# Patient Record
Sex: FEMALE | Race: WHITE | NOT HISPANIC OR LATINO | Employment: STUDENT | URBAN - METROPOLITAN AREA
[De-identification: names, ages, dates, MRNs, and addresses within clinical notes are randomized per-mention and may not be internally consistent; named-entity substitution may affect disease eponyms.]

---
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
## 2022-01-23 ENCOUNTER — E-VISIT (OUTPATIENT)
Dept: PEDIATRICS | Facility: CLINIC | Age: 22
End: 2022-01-23
Payer: COMMERCIAL

## 2022-01-23 DIAGNOSIS — R53.83 FATIGUE, UNSPECIFIED TYPE: Primary | ICD-10-CM

## 2022-01-23 PROCEDURE — 99421 OL DIG E/M SVC 5-10 MIN: CPT | Performed by: NURSE PRACTITIONER

## 2022-01-26 ENCOUNTER — LAB (OUTPATIENT)
Dept: LAB | Facility: CLINIC | Age: 22
End: 2022-01-26
Payer: COMMERCIAL

## 2022-01-26 DIAGNOSIS — R53.83 FATIGUE, UNSPECIFIED TYPE: ICD-10-CM

## 2022-01-26 LAB
BASOPHILS # BLD AUTO: 0 10E3/UL (ref 0–0.2)
BASOPHILS NFR BLD AUTO: 1 %
DEPRECATED CALCIDIOL+CALCIFEROL SERPL-MC: 30 UG/L (ref 20–75)
EOSINOPHIL # BLD AUTO: 0.1 10E3/UL (ref 0–0.7)
EOSINOPHIL NFR BLD AUTO: 1 %
ERYTHROCYTE [DISTWIDTH] IN BLOOD BY AUTOMATED COUNT: 12.1 % (ref 10–15)
HCT VFR BLD AUTO: 39.5 % (ref 35–47)
HGB BLD-MCNC: 13.2 G/DL (ref 11.7–15.7)
IMM GRANULOCYTES # BLD: 0 10E3/UL
IMM GRANULOCYTES NFR BLD: 0 %
LYMPHOCYTES # BLD AUTO: 2.8 10E3/UL (ref 0.8–5.3)
LYMPHOCYTES NFR BLD AUTO: 47 %
MCH RBC QN AUTO: 29.9 PG (ref 26.5–33)
MCHC RBC AUTO-ENTMCNC: 33.4 G/DL (ref 31.5–36.5)
MCV RBC AUTO: 90 FL (ref 78–100)
MONOCYTES # BLD AUTO: 0.5 10E3/UL (ref 0–1.3)
MONOCYTES NFR BLD AUTO: 8 %
NEUTROPHILS # BLD AUTO: 2.5 10E3/UL (ref 1.6–8.3)
NEUTROPHILS NFR BLD AUTO: 43 %
PLATELET # BLD AUTO: 277 10E3/UL (ref 150–450)
RBC # BLD AUTO: 4.41 10E6/UL (ref 3.8–5.2)
WBC # BLD AUTO: 5.9 10E3/UL (ref 4–11)

## 2022-01-26 PROCEDURE — 82306 VITAMIN D 25 HYDROXY: CPT

## 2022-01-26 PROCEDURE — 36415 COLL VENOUS BLD VENIPUNCTURE: CPT

## 2022-01-26 PROCEDURE — 80050 GENERAL HEALTH PANEL: CPT

## 2022-01-26 PROCEDURE — 83550 IRON BINDING TEST: CPT

## 2022-01-27 LAB
ALBUMIN SERPL-MCNC: 3.8 G/DL (ref 3.4–5)
ALP SERPL-CCNC: 82 U/L (ref 40–150)
ALT SERPL W P-5'-P-CCNC: 20 U/L (ref 0–50)
ANION GAP SERPL CALCULATED.3IONS-SCNC: 5 MMOL/L (ref 3–14)
AST SERPL W P-5'-P-CCNC: 13 U/L (ref 0–35)
BILIRUB SERPL-MCNC: 0.5 MG/DL (ref 0.2–1.3)
BUN SERPL-MCNC: 13 MG/DL (ref 7–30)
CALCIUM SERPL-MCNC: 8.9 MG/DL (ref 8.5–10.1)
CHLORIDE BLD-SCNC: 108 MMOL/L (ref 96–110)
CO2 SERPL-SCNC: 25 MMOL/L (ref 20–32)
CREAT SERPL-MCNC: 0.64 MG/DL (ref 0.5–1)
GFR SERPL CREATININE-BSD FRML MDRD: >90 ML/MIN/1.73M2
GLUCOSE BLD-MCNC: 94 MG/DL (ref 70–99)
IRON SATN MFR SERPL: 44 % (ref 15–46)
IRON SERPL-MCNC: 130 UG/DL (ref 35–180)
POTASSIUM BLD-SCNC: 3.5 MMOL/L (ref 3.4–5.3)
PROT SERPL-MCNC: 7.1 G/DL (ref 6.8–8.8)
SODIUM SERPL-SCNC: 138 MMOL/L (ref 133–144)
TIBC SERPL-MCNC: 298 UG/DL (ref 240–430)
TSH SERPL DL<=0.005 MIU/L-ACNC: 2.1 MU/L (ref 0.4–4)

## 2022-03-29 DIAGNOSIS — F41.1 GENERALIZED ANXIETY DISORDER: ICD-10-CM

## 2022-03-29 DIAGNOSIS — F32.2 CURRENT SEVERE EPISODE OF MAJOR DEPRESSIVE DISORDER WITHOUT PSYCHOTIC FEATURES WITHOUT PRIOR EPISODE (H): ICD-10-CM

## 2022-03-29 RX ORDER — ESCITALOPRAM OXALATE 20 MG/1
20 TABLET ORAL DAILY
Qty: 30 TABLET | Refills: 0 | Status: SHIPPED | OUTPATIENT
Start: 2022-03-29 | End: 2022-05-19

## 2022-03-29 NOTE — TELEPHONE ENCOUNTER
Routing refill request to provider for review/approval because:  Labs out of range:  PHQ-9    PHQ 7/26/2021 11/29/2021 11/29/2021   PHQ-9 Total Score 13 27 27   Q9: Thoughts of better off dead/self-harm past 2 weeks Not at all Nearly every day Nearly every day   F/U: Thoughts of suicide or self-harm - Yes Yes   F/U: Self harm-plan - Yes Yes   F/U: Self-harm action - No No   F/U: Safety concerns - Yes Yes   PHQ-A Total Score - - -   PHQ-A Depressed most days in past year - - -   PHQ-A Mood affect on daily activities - - -   PHQ-A Suicide Ideation past 2 weeks - - -   PHQ-A Suicide Ideation past month - - -   PHQ-A Previous suicide attempt - - -    Last prescribing provider not PCP, will route to PCP.    Yudelka JACOBS RN

## 2022-03-29 NOTE — TELEPHONE ENCOUNTER
Update phq and heraclio pls. If still hgih, needs to schedule visit with me. VRT ok, same day ok.

## 2022-04-04 ENCOUNTER — OFFICE VISIT (OUTPATIENT)
Dept: URGENT CARE | Facility: URGENT CARE | Age: 22
End: 2022-04-04
Payer: COMMERCIAL

## 2022-04-04 VITALS
SYSTOLIC BLOOD PRESSURE: 98 MMHG | OXYGEN SATURATION: 98 % | TEMPERATURE: 98 F | RESPIRATION RATE: 16 BRPM | BODY MASS INDEX: 18.33 KG/M2 | WEIGHT: 110 LBS | DIASTOLIC BLOOD PRESSURE: 58 MMHG | HEIGHT: 65 IN | HEART RATE: 76 BPM

## 2022-04-04 DIAGNOSIS — B35.9 RINGWORM: Primary | ICD-10-CM

## 2022-04-04 PROCEDURE — 99213 OFFICE O/P EST LOW 20 MIN: CPT | Performed by: PHYSICIAN ASSISTANT

## 2022-04-04 RX ORDER — CLOTRIMAZOLE AND BETAMETHASONE DIPROPIONATE 10; .64 MG/G; MG/G
CREAM TOPICAL 2 TIMES DAILY
Qty: 45 G | Refills: 0 | Status: SHIPPED | OUTPATIENT
Start: 2022-04-04 | End: 2023-06-23

## 2022-04-04 ASSESSMENT — ENCOUNTER SYMPTOMS: FEVER: 1

## 2022-04-04 NOTE — PATIENT INSTRUCTIONS
Patient Education     Ringworm of the Skin     Ringworm is a fungal infection of the skin. Despite the name, a worm doesn't cause it. The cause of ringworm is a fungus that infects the outer layers of the skin.  The medical term for ringworm is tinea. It can affect most parts of your body, although it seems to do better in moist areas of the body and around hair. It can be on almost any part of your body, including:    Arms, hands, legs, chest, feet, and back    Scalp    Beard    Groin    Between the toes  Depending on where it is located, sometimes the name changes. For example:    Tinea capitis (scalp)    Tinea cruris (groin)    Tinea corporis (body)    Tinea pedis (feet)  Causes  Ringworm is very common all over the world, including the [place] It can take less than 1 week up to 2 weeks before you develop the infection after being exposed. So, you may not figure out the exact cause.  It's spread through direct contact with:    An infected person or animal    Infected soil, or objects such as towels, clothing, and blanco  Symptoms  At first you might not notice ringworm. Or you may just see a small, red, often raised itchy spot or pimple. Sometimes there may only be one spot. At other times there may be several. Ringworm can look slightly different on different parts of the body, but there are some things are always present:    Irregular, round, oval or ring-shaped, which is why it's called ringworm    Clearer or lighter color at the center, since it spreads from the center of the spot outward    Red or inflamed look    Raised    Itchy    Scaly, dry, or flaky  Home care  Follow these tips to help care for yourself at home:    Leave it alone. Don't scratch at the rash or pick it. This can increase the chance of infection and scarring.    Take medicine as prescribed. If you were prescribed a cream, apply it exactly as directed. Make sure to put the cream not just on the rash, but also on the skin 1 or 2 inches around  it. Medicine by mouth is sometimes needed, particularly for ringworm on the scalp. Take it as directed and until your healthcare provider says to stop. Some ringworm creams are now available without a prescription (over the counter). Talk with your healthcare provider about these, as they may be just as effective but less expensive than prescription medicines in some cases.    Keep it from spreading to others.  Untreated ringworm of the skin is contagious by skin-to-skin contact. An affected child may return to school 2 days after treatment has started.  Prevention  To some degree, prevention depends on what part of your body was affected. In general, the following good hygiene can help.    Clean up after you get dirty or sweaty, or after using a locker room.    When possible, don t share blanco and brushes.    Avoid having your skin and feet wet or damp for long periods.    Wear clean, loose-fitting underwear.  Follow-up care  Follow up with your healthcare provider as advised by our staff if the rash does not improve after 10 days of treatment or if the rash spreads to other areas of the body.  When to seek medical care  Call your healthcare provider right away if any of these occur:    Redness around the rash gets worse    Fluid drains from the rash    Fever of 100.4 F (38 C) or higher, or as directed by your healthcare provider  [employer] last reviewed this educational content on 8/1/2019 2000-2021 [employer]. All rights reserved. This information is not intended as a substitute for professional medical care. Always follow your healthcare professional's instructions.

## 2022-04-20 ENCOUNTER — OFFICE VISIT (OUTPATIENT)
Dept: URGENT CARE | Facility: URGENT CARE | Age: 22
End: 2022-04-20
Payer: COMMERCIAL

## 2022-04-20 VITALS
RESPIRATION RATE: 18 BRPM | OXYGEN SATURATION: 95 % | SYSTOLIC BLOOD PRESSURE: 91 MMHG | TEMPERATURE: 98.3 F | DIASTOLIC BLOOD PRESSURE: 55 MMHG | BODY MASS INDEX: 18.99 KG/M2 | HEIGHT: 65 IN | HEART RATE: 96 BPM | WEIGHT: 114 LBS

## 2022-04-20 DIAGNOSIS — J01.90 ACUTE SINUSITIS WITH SYMPTOMS > 10 DAYS: Primary | ICD-10-CM

## 2022-04-20 DIAGNOSIS — J02.9 SORE THROAT: ICD-10-CM

## 2022-04-20 LAB
DEPRECATED S PYO AG THROAT QL EIA: NEGATIVE
GROUP A STREP BY PCR: DETECTED

## 2022-04-20 PROCEDURE — 87651 STREP A DNA AMP PROBE: CPT | Performed by: INTERNAL MEDICINE

## 2022-04-20 PROCEDURE — 99213 OFFICE O/P EST LOW 20 MIN: CPT | Performed by: INTERNAL MEDICINE

## 2022-04-20 RX ORDER — FLUTICASONE PROPIONATE 50 MCG
1 SPRAY, SUSPENSION (ML) NASAL 2 TIMES DAILY
Qty: 16 G | Refills: 0 | Status: SHIPPED | OUTPATIENT
Start: 2022-04-20 | End: 2022-04-30

## 2022-04-20 NOTE — PROGRESS NOTES
"ASSESSMENT AND PLAN:      ICD-10-CM    1. Acute sinusitis with symptoms > 10 days  J01.90 amoxicillin-clavulanate (AUGMENTIN) 875-125 MG tablet     fluticasone (FLONASE) 50 MCG/ACT nasal spray   2. Sore throat  J02.9 Streptococcus A Rapid Screen w/Reflex to PCR - Clinic Collect     Group A Streptococcus PCR Throat Swab       PLAN: augmentin  flonase  URI Adult:  Fluids, Rest and Saline gargles    There are no Patient Instructions on file for this visit.  Return in about 1 week (around 4/27/2022), or if symptoms worsen or fail to improve.        Cassie Martell MD  [facility] URGENT CARE    Subjective     Karishma Tyson is a 21 year old who presents for Patient presents with:  Urgent Care  Pharyngitis: RUNNING NOSE, SORE THROAT GETTING WORSE X2 WEEKS.    an established patient of [facility].    URI Peds    Onset of symptoms was 2 week(s) ago.  Course of illness is worsening.      Current and Associated symptoms: stuffy nose, ear pain, sore throat , facial pain/pressure, headache, body aches and fatigue  Denies fever currently  Treatment measures tried include Tylenol/Ibuprofen and OTC Cough med  Predisposing factors include ill contact: Family member, sister had mono    2 negative home covid tests    Review of Systems        Objective    BP 91/55   Pulse 96   Temp 98.3  F (36.8  C) (Temporal)   Resp 18   Ht 1.651 m (5' 5\")   Wt 51.7 kg (114 lb)   LMP 03/28/2022   SpO2 95%   BMI 18.97 kg/m    Physical Exam  Vitals reviewed.   Constitutional:       Appearance: Normal appearance.   HENT:      Right Ear: Tympanic membrane normal.      Left Ear: Tympanic membrane normal.      Nose: Nose normal.      Mouth/Throat:      Mouth: Mucous membranes are moist.      Comments: Green PND  Cardiovascular:      Rate and Rhythm: Normal rate and regular rhythm.      Pulses: Normal pulses.      Heart sounds: Normal heart sounds.   Pulmonary:      Effort: Pulmonary effort is normal.      Breath sounds: Normal " breath sounds.   Neurological:      Mental Status: She is alert.            Results for orders placed or performed in visit on 04/20/22 (from the past 24 hour(s))   Streptococcus A Rapid Screen w/Reflex to PCR - Clinic Collect    Specimen: Throat; Swab   Result Value Ref Range    Group A Strep antigen Negative Negative

## 2022-05-01 ENCOUNTER — HEALTH MAINTENANCE LETTER (OUTPATIENT)
Age: 22
End: 2022-05-01

## 2022-05-03 ENCOUNTER — OFFICE VISIT (OUTPATIENT)
Dept: URGENT CARE | Facility: URGENT CARE | Age: 22
End: 2022-05-03
Payer: COMMERCIAL

## 2022-05-03 VITALS
DIASTOLIC BLOOD PRESSURE: 59 MMHG | OXYGEN SATURATION: 98 % | SYSTOLIC BLOOD PRESSURE: 92 MMHG | BODY MASS INDEX: 18.33 KG/M2 | HEART RATE: 68 BPM | WEIGHT: 110 LBS | TEMPERATURE: 98.6 F | HEIGHT: 65 IN

## 2022-05-03 DIAGNOSIS — J03.01 RECURRENT STREPTOCOCCAL TONSILLITIS: ICD-10-CM

## 2022-05-03 DIAGNOSIS — R07.0 THROAT PAIN: ICD-10-CM

## 2022-05-03 DIAGNOSIS — J02.0 STREP THROAT: Primary | ICD-10-CM

## 2022-05-03 LAB — DEPRECATED S PYO AG THROAT QL EIA: POSITIVE

## 2022-05-03 PROCEDURE — 99213 OFFICE O/P EST LOW 20 MIN: CPT | Performed by: NURSE PRACTITIONER

## 2022-05-03 PROCEDURE — 87880 STREP A ASSAY W/OPTIC: CPT | Performed by: NURSE PRACTITIONER

## 2022-05-03 RX ORDER — AMOXICILLIN 500 MG/1
500 CAPSULE ORAL 2 TIMES DAILY
Qty: 20 CAPSULE | Refills: 0 | Status: SHIPPED | OUTPATIENT
Start: 2022-05-03 | End: 2022-05-13

## 2022-05-03 NOTE — PROGRESS NOTES
"Chief Complaint   Patient presents with     Urgent Care     Throat Pain     Pt in clinic to have eval for sore throat.     SUBJECTIVE:  Karishma Tyson is a 21 year old female presenting with sore throat since yesterday. She relays just having strep throat 2 weeks ago and finished Augmentin, but symptoms returned. She did not throw away her toothbrush. She also relays having recurrent episodes of sinusitis, strep throat over the past year. She has been sick almost every month. Wondering about seeing an ENT specialist. Declines snoring, shortness of breath.    Past Medical History:   Diagnosis Date     Abdominal pain 2010     Constipation 2012     DERMATITIS NOS 10/13/2006      jaundice     admitted for phototherapy     OTITIS MEDIA NOS 10/13/2006     amphetamine-dextroamphetamine (ADDERALL XR) 10 MG 24 hr capsule,   escitalopram (LEXAPRO) 20 MG tablet, Take 1 tablet (20 mg) by mouth daily  clotrimazole-betamethasone (LOTRISONE) 1-0.05 % external cream, Apply topically 2 times daily (Patient not taking: Reported on 5/3/2022)  etonogestrel-ethinyl estradiol (NUVARING) 0.12-0.015 MG/24HR vaginal ring, Place 1 each vaginally every 28 days (Patient not taking: Reported on 5/3/2022)  multivitamin w/minerals (THERA-VIT-M) tablet, Take 1 tablet by mouth daily (Patient not taking: Reported on 5/3/2022)  mupirocin (BACTROBAN) 2 % external ointment, Apply topically 3 times daily (Patient not taking: Reported on 5/3/2022)    No current facility-administered medications on file prior to visit.    Social History     Tobacco Use     Smoking status: Never Smoker     Smokeless tobacco: Never Used     Tobacco comment: non smoking home   Substance Use Topics     Alcohol use: No     Allergies   Allergen Reactions     Gluten GI Disturbance     Review of Systems   All systems negative except for those listed above in HPI.    OBJECTIVE:   BP 92/59   Pulse 68   Temp 98.6  F (37  C) (Temporal)   Ht 1.651 m (5' 5\") "   Wt 49.9 kg (110 lb)   LMP 04/24/2022   SpO2 98%   BMI 18.30 kg/m       Physical Exam  Constitutional:       General: She is not in acute distress.     Appearance: She is well-developed. She is not ill-appearing, toxic-appearing or diaphoretic.   HENT:      Head: Normocephalic and atraumatic.      Mouth/Throat:      Mouth: Mucous membranes are moist.      Pharynx: Oropharynx is clear. Posterior oropharyngeal erythema present. No oropharyngeal exudate.      Comments: Dark red tonsils, uvula and posterior oropharynx.  Eyes:      Conjunctiva/sclera: Conjunctivae normal.      Pupils: Pupils are equal, round, and reactive to light.   Cardiovascular:      Rate and Rhythm: Normal rate.   Pulmonary:      Effort: Pulmonary effort is normal. No respiratory distress.   Musculoskeletal:         General: Normal range of motion.      Cervical back: Normal range of motion and neck supple.   Lymphadenopathy:      Cervical: Cervical adenopathy present.   Skin:     General: Skin is warm.      Capillary Refill: Capillary refill takes less than 2 seconds.      Findings: No rash.   Neurological:      General: No focal deficit present.      Mental Status: She is alert and oriented to person, place, and time.   Psychiatric:         Mood and Affect: Mood normal.         Behavior: Behavior normal.       ASSESSMENT:    ICD-10-CM    1. Strep throat  J02.0 amoxicillin (AMOXIL) 500 MG capsule   2. Throat pain  R07.0 Streptococcus A Rapid Screen w/Reflex to PCR - Clinic Collect   3. Recurrent streptococcal tonsillitis  J03.01 Otolaryngology Referral     PLAN:     Antibiotics as directed.  ENT referral placed per request - advised that usually they wait for tonsillectomy until 7 episodes in a year  Drink plenty of fluids and rest.  May use salt water gargles- about 8 oz warm water with about 1 teaspoon salt  Sucrets and Cepacol spray are over the counter medications that numb the throat.  Over the counter pain relievers such as tylenol or  "ibuprofen may be used as needed.   Honey lemon tea helps to soothe the throat. \"Throat Coat\" tea is soothing as well.  Change toothbrush after 24 hours of antibiotics (may soak in 3-6% hydrogen peroxide)  Will be contagious for 24 hours after starting antibiotic  May return to school//work/activities 24 hours after antibiotics are started.  Wash hands frequently and do not share beverages.  Please follow up with primary care provider if symptoms are not improving, worsening or new symptoms or for any adverse reactions to medications.     Follow up with primary care provider with any problems, questions or concerns or if symptoms worsen or fail to improve. Patient agreed to plan and verbalized understanding.    Patti Melton, JANES-BC  [facility]  "

## 2022-05-17 ENCOUNTER — E-VISIT (OUTPATIENT)
Dept: PEDIATRICS | Facility: CLINIC | Age: 22
End: 2022-05-17
Payer: COMMERCIAL

## 2022-05-17 DIAGNOSIS — F32.2 CURRENT SEVERE EPISODE OF MAJOR DEPRESSIVE DISORDER WITHOUT PSYCHOTIC FEATURES WITHOUT PRIOR EPISODE (H): ICD-10-CM

## 2022-05-17 DIAGNOSIS — F41.1 GENERALIZED ANXIETY DISORDER: ICD-10-CM

## 2022-05-17 PROCEDURE — 99421 OL DIG E/M SVC 5-10 MIN: CPT | Performed by: NURSE PRACTITIONER

## 2022-05-17 ASSESSMENT — ANXIETY QUESTIONNAIRES
GAD7 TOTAL SCORE: 21
4. TROUBLE RELAXING: NEARLY EVERY DAY
3. WORRYING TOO MUCH ABOUT DIFFERENT THINGS: NEARLY EVERY DAY
2. NOT BEING ABLE TO STOP OR CONTROL WORRYING: NEARLY EVERY DAY
8. IF YOU CHECKED OFF ANY PROBLEMS, HOW DIFFICULT HAVE THESE MADE IT FOR YOU TO DO YOUR WORK, TAKE CARE OF THINGS AT HOME, OR GET ALONG WITH OTHER PEOPLE?: EXTREMELY DIFFICULT
7. FEELING AFRAID AS IF SOMETHING AWFUL MIGHT HAPPEN: NEARLY EVERY DAY
5. BEING SO RESTLESS THAT IT IS HARD TO SIT STILL: NEARLY EVERY DAY
GAD7 TOTAL SCORE: 21
7. FEELING AFRAID AS IF SOMETHING AWFUL MIGHT HAPPEN: NEARLY EVERY DAY
1. FEELING NERVOUS, ANXIOUS, OR ON EDGE: NEARLY EVERY DAY
GAD7 TOTAL SCORE: 21
6. BECOMING EASILY ANNOYED OR IRRITABLE: NEARLY EVERY DAY

## 2022-05-17 ASSESSMENT — PATIENT HEALTH QUESTIONNAIRE - PHQ9
SUM OF ALL RESPONSES TO PHQ QUESTIONS 1-9: 21
SUM OF ALL RESPONSES TO PHQ QUESTIONS 1-9: 21
10. IF YOU CHECKED OFF ANY PROBLEMS, HOW DIFFICULT HAVE THESE PROBLEMS MADE IT FOR YOU TO DO YOUR WORK, TAKE CARE OF THINGS AT HOME, OR GET ALONG WITH OTHER PEOPLE: EXTREMELY DIFFICULT

## 2022-05-18 ASSESSMENT — PATIENT HEALTH QUESTIONNAIRE - PHQ9: SUM OF ALL RESPONSES TO PHQ QUESTIONS 1-9: 21

## 2022-05-18 ASSESSMENT — ANXIETY QUESTIONNAIRES: GAD7 TOTAL SCORE: 21

## 2022-05-18 NOTE — TELEPHONE ENCOUNTER
The pt called back and wanted to let the provider know that the preferred [facility] [address] [place] on 5/18/2022 at 4:30 PM

## 2022-05-19 RX ORDER — ESCITALOPRAM OXALATE 20 MG/1
20 TABLET ORAL DAILY
Qty: 30 TABLET | Refills: 0 | Status: SHIPPED | OUTPATIENT
Start: 2022-05-19 | End: 2022-06-22

## 2022-05-19 RX ORDER — BUSPIRONE HYDROCHLORIDE 10 MG/1
10 TABLET ORAL 2 TIMES DAILY
Qty: 180 TABLET | Refills: 0 | Status: SHIPPED | OUTPATIENT
Start: 2022-05-19 | End: 2022-12-20

## 2022-06-21 ENCOUNTER — OFFICE VISIT (OUTPATIENT)
Dept: URGENT CARE | Facility: URGENT CARE | Age: 22
End: 2022-06-21
Payer: COMMERCIAL

## 2022-06-21 VITALS
RESPIRATION RATE: 18 BRPM | SYSTOLIC BLOOD PRESSURE: 103 MMHG | BODY MASS INDEX: 18.3 KG/M2 | DIASTOLIC BLOOD PRESSURE: 63 MMHG | WEIGHT: 110 LBS | HEART RATE: 91 BPM | TEMPERATURE: 98.2 F | OXYGEN SATURATION: 98 %

## 2022-06-21 DIAGNOSIS — R07.0 THROAT PAIN: Primary | ICD-10-CM

## 2022-06-21 LAB
DEPRECATED S PYO AG THROAT QL EIA: NEGATIVE
GROUP A STREP BY PCR: NOT DETECTED

## 2022-06-21 PROCEDURE — 87651 STREP A DNA AMP PROBE: CPT | Performed by: FAMILY MEDICINE

## 2022-06-21 PROCEDURE — 99213 OFFICE O/P EST LOW 20 MIN: CPT | Performed by: FAMILY MEDICINE

## 2022-06-21 NOTE — PROGRESS NOTES
SUBJECTIVE: Karishma Tyson is a 21 year old female presenting with a chief complaint of sore throat.  Onset of symptoms was day(s) ago.  Course of illness is same.    Severity moderate    Past Medical History:   Diagnosis Date     Abdominal pain 2010     Constipation 2012     DERMATITIS NOS 10/13/2006      jaundice     admitted for phototherapy     OTITIS MEDIA NOS 10/13/2006     Allergies   Allergen Reactions     Gluten GI Disturbance     Social History     Tobacco Use     Smoking status: Never Smoker     Smokeless tobacco: Never Used     Tobacco comment: non smoking home   Substance Use Topics     Alcohol use: No       ROS:  SKIN: no rash  GI: no vomiting    OBJECTIVE:  /63   Pulse 91   Temp 98.2  F (36.8  C)   Resp 18   Wt 49.9 kg (110 lb)   LMP 2022   SpO2 98%   BMI 18.30 kg/m  GENERAL APPEARANCE: healthy, alert and no distress  EYES: EOMI,  PERRL, conjunctiva clear  HENT: ear canals and TM's normal.  Nose and mouth without ulcers, erythema or lesions  RESP: lungs clear to auscultation - no rales, rhonchi or wheezes  SKIN: no suspicious lesions or rashes      ICD-10-CM    1. Throat pain  R07.0 Streptococcus A Rapid Screen w/Reflex to PCR - Clinic Collect     Group A Streptococcus PCR Throat Swab       Fluids/Rest, f/u if worse/not any better

## 2022-06-30 ENCOUNTER — OFFICE VISIT (OUTPATIENT)
Dept: OTOLARYNGOLOGY | Facility: CLINIC | Age: 22
End: 2022-06-30
Attending: NURSE PRACTITIONER
Payer: COMMERCIAL

## 2022-06-30 DIAGNOSIS — J03.01 RECURRENT STREPTOCOCCAL TONSILLITIS: ICD-10-CM

## 2022-06-30 PROCEDURE — 99203 OFFICE O/P NEW LOW 30 MIN: CPT | Performed by: OTOLARYNGOLOGY

## 2022-06-30 NOTE — LETTER
6/30/2022         RE: Karishma Tyson  [address] [place] [address]        Dear Colleague,    Thank you for referring your patient, Karishma Tyson, to the [facility]. Please see a copy of my visit note below.    CHIEF COMPLAINT:  Tonsil concerns    HISTORY OF PRESENT ILLNESS    Ericka was seen at the behest of Patti Melton NP for recurrent tonsillitis.  She feels like her throat is always sore. She has been on multiple courses of antibiotics over the past year.  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  Has 1 roommate in the fall.  Also reports 3 episodes of sinusitis/bronchitis in the past year. No history of allergies.        REVIEW OF SYSTEMS    Review of Systems as per HPI and PMHx, otherwise 10 system review system are negative.     Gluten     There were no vitals taken for this visit.    HEAD: Normal appearance and symmetry:  No cutaneous lesions.      NECK:  supple     EARS: normal TM, EACs    EYES:  EOMI    CN VII/XII:  intact     NOSE:     Dorsum:   straight  Septum:  midline  Mucosa:  moist        ORAL CAVITY/OROPHARYNX:     Lips:  Normal.  Tongue: normal, midline  Mucosa:   no lesions  Tonsils:  3+, cryptic     NECK:  Trachea:  midline.              Thyroid:  normal              Adenopathy:  none        NEURO:   Alert and Oriented     GAIT AND STATION:  normal     RESPIRATORY:   Symmetry and Respiratory effort     PSYCH:  Normal mood and affect     SKIN:   warm and dry         IMPRESSION:    Encounter Diagnosis   Name Primary?     Recurrent streptococcal tonsillitis        RECOMMENDATIONS:      Orders Placed This Encounter   Procedures     Case Request: TONSILLECTOMY       R/B/A of tonsillectomy discussed.  Will plan in August after classes are over.           Again, thank you for allowing me to participate in the care of your patient.        Sincerely,        Fabian Zee MD

## 2022-06-30 NOTE — PATIENT INSTRUCTIONS
"MANUKA HONEY FOR POST TONSILLECTOMY HEALING    Manuka honey is native to [place]     Unlike traditional honey. Manuka honey has antibacterial activity    It can reduce pain after tonsillectomy and speeds up wound healing.    1 teaspoon of manuka honey 2-3 times a day can help after tonsil surgery    It is best taken directly from the spoon but if needed you can mix it in plain or lukewarm water. Avoid hot water.    UMF or unique manuka factor rating is a score given to manuka honey based on methylglyoxal content.     Effective manuka honey should have UMF rating of 10 or above.    You can also find a Manuka \"soothing pop\" available on [employer]    Drink plenty of fluids after surgery  Ear pain is common after surgery (referred pain from the throat)  Avoid strenuous activity for 2 weeks after your procedure  Avoid foods with sharp edge (chips, etc)  Alternate tylenol and motrin throughout the day to keep your pain under control  Use the oxycodone for excessive pain  (caution, this medication can slow the breathing, cause nausea, and constipation)  You may want to use a stool softener while you are taking the oxycodone to prevent constipation  Go immediately to the nearest emergency room if you experience bleeding after your surgery (most commonly occurs 5-7 days after surgery)   "

## 2022-06-30 NOTE — PROGRESS NOTES
CHIEF COMPLAINT:  Tonsil concerns    HISTORY OF PRESENT ILLNESS    Ericka was seen at the behest of Patti Melton NP for recurrent tonsillitis.  She feels like her throat is always sore. She has been on multiple courses of antibiotics over the past year.  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  Has 1 roommate in the fall.  Also reports 3 episodes of sinusitis/bronchitis in the past year. No history of allergies.        REVIEW OF SYSTEMS    Review of Systems as per HPI and PMHx, otherwise 10 system review system are negative.     Gluten     There were no vitals taken for this visit.    HEAD: Normal appearance and symmetry:  No cutaneous lesions.      NECK:  supple     EARS: normal TM, EACs    EYES:  EOMI    CN VII/XII:  intact     NOSE:     Dorsum:   straight  Septum:  midline  Mucosa:  moist        ORAL CAVITY/OROPHARYNX:     Lips:  Normal.  Tongue: normal, midline  Mucosa:   no lesions  Tonsils:  3+, cryptic     NECK:  Trachea:  midline.              Thyroid:  normal              Adenopathy:  none        NEURO:   Alert and Oriented     GAIT AND STATION:  normal     RESPIRATORY:   Symmetry and Respiratory effort     PSYCH:  Normal mood and affect     SKIN:   warm and dry         IMPRESSION:    Encounter Diagnosis   Name Primary?     Recurrent streptococcal tonsillitis        RECOMMENDATIONS:      Orders Placed This Encounter   Procedures     Case Request: TONSILLECTOMY       R/B/A of tonsillectomy discussed.  Will plan in August after classes are over.

## 2022-07-01 PROBLEM — J03.01 RECURRENT STREPTOCOCCAL TONSILLITIS: Status: ACTIVE | Noted: 2022-07-01

## 2022-07-05 ENCOUNTER — TELEPHONE (OUTPATIENT)
Dept: OTOLARYNGOLOGY | Facility: CLINIC | Age: 22
End: 2022-07-05

## 2022-07-05 ENCOUNTER — MYC MEDICAL ADVICE (OUTPATIENT)
Dept: SURGERY | Facility: CLINIC | Age: 22
End: 2022-07-05

## 2022-07-05 NOTE — TELEPHONE ENCOUNTER
vm that I will mychart some dates to her for surgery.  MyChart or call [phone] and let me know date if you want to move forward. And that [email or web address] message will come straight to me.  July 12 or 26th open with Dr. Zee at [facility] currently.

## 2022-07-18 NOTE — TELEPHONE ENCOUNTER
Spoke with Ericka regarding surgery scheduling     Went over details/instructions and info has been put in a Surgery Letter sent via [employer] with Ericka agreed with.  She will contact us if she comes up with follow up questions.  LETTER:    We've received instruction to get you scheduled for Outpatient Surgery with Dr Zee. We have that arranged as follows:     Pre-op Physical:  Ericka will call her  primary clinic to schedule this within 30 days of her                                             Surgery Date.    Surgery Date: 8/16/2022     Location: [place] [address]    Approximate Arrival Time: 1:30 p.m.  (Unless instructed differently by the pre-op call nurse)     Post op Appointment: 9/15/2025 at  9 a.m. with Dr. Zee at the                                          [facility] [place] [address].    Prep Tasks and Info:     1. A pre-op physical with your primary care doctor within 30 days of surgery. This is required by anesthesia and if not done, your surgery will be cancelled. Call them asap to get this scheduled.    2. Review your medications with your primary care or prescribing physician; they will advise you which meds to stop and when, and when you can resume taking.  Certain medications like blood thinners, need to be stopped in advance of surgery to proceed safely.    3. You must get tested for COVID-19, even if you are vaccinated.    Outpatient Surgery:  If you are going home the same day of surgery, a home rapid antigen Covid-19 test is required 1-2 days before surgery- regardless of your vaccination status.  Take a photo of the negative result and show to the nurse on the day of surgery. If you test positive, contact our office right away to reschedule surgery. You can buy a home Covid-19 Rapid Antigen test at many local pharmacies, or you can order for  free at covid.gov/tests.    4. Please shower the evening before and morning of surgery with Hibiclens or Exidine soap.  This can be found at your local pharmacy.    5. Fasting instructions will be provided by the pre-op nurse who will call you 1-3 days before surgery.  Typically we advise normal food up to 8 hours before surgery then clear liquids only up to 2 hours before surgery then nothing at all by mouth for 2 hours including no gum or candy.  The nurse will review your specific instructions with you at the call.      6. Smoking impacts your body's ability to heal properly.  If you are a smoker, we strongly urge you to stop smoking 4-6 weeks before surgery.    7. You will need an adult to drive you home and stay with you 24 hours after surgery. Public transportation or Medical Van Services are not permitted.    8. You may have one family member wait in the lobby at the surgery center during your surgery. Visitor restrictions are subject to change, please verify with the pre-op nurse when they call.    9. If the community sees a new COVID19 surge, your procedure may need to be postponed. We will contact you if this happens. You will be screened for high-risk exposure to Covid-19 during the pre-op call.  We encourage you to quarantine yourself away from any Covid-19 people for 10 days before surgery to avoid possible last minute cancellations.   When you arrive to the surgery center, you will again be screened for COVID19 symptoms. If you screen positive, your surgery will need to be postponed.    10. We always encourage you to notify your insurance any time you have medical tests or procedures scheduled including surgery. The number is usually right on the back of your insurance card. Please call  [employer] [facility] at [phone] if you'd like a surgery quote.       Call our office if you have any questions! Thank you!

## 2022-07-29 ENCOUNTER — OFFICE VISIT (OUTPATIENT)
Dept: URGENT CARE | Facility: URGENT CARE | Age: 22
End: 2022-07-29
Payer: COMMERCIAL

## 2022-07-29 VITALS
DIASTOLIC BLOOD PRESSURE: 62 MMHG | WEIGHT: 110 LBS | BODY MASS INDEX: 18.3 KG/M2 | OXYGEN SATURATION: 97 % | SYSTOLIC BLOOD PRESSURE: 100 MMHG | HEART RATE: 89 BPM | RESPIRATION RATE: 16 BRPM | TEMPERATURE: 97.5 F

## 2022-07-29 DIAGNOSIS — R05.8 PRODUCTIVE COUGH: Primary | ICD-10-CM

## 2022-07-29 DIAGNOSIS — R06.02 SOB (SHORTNESS OF BREATH): ICD-10-CM

## 2022-07-29 PROCEDURE — 99214 OFFICE O/P EST MOD 30 MIN: CPT | Performed by: FAMILY MEDICINE

## 2022-07-29 RX ORDER — PREDNISONE 20 MG/1
20 TABLET ORAL 2 TIMES DAILY
Qty: 10 TABLET | Refills: 0 | Status: SHIPPED | OUTPATIENT
Start: 2022-07-29 | End: 2022-08-03

## 2022-07-29 RX ORDER — ALBUTEROL SULFATE 90 UG/1
2 AEROSOL, METERED RESPIRATORY (INHALATION) EVERY 6 HOURS
Qty: 18 G | Refills: 0 | Status: SHIPPED | OUTPATIENT
Start: 2022-07-29 | End: 2023-07-20

## 2022-07-29 RX ORDER — AZITHROMYCIN 250 MG/1
TABLET, FILM COATED ORAL
Qty: 6 TABLET | Refills: 0 | Status: SHIPPED | OUTPATIENT
Start: 2022-07-29 | End: 2022-08-03

## 2022-07-29 NOTE — PROGRESS NOTES
SUBJECTIVE: Karishma Tyson is a 21 year old female presenting with a chief complaint of cough .  Onset of symptoms was 3 week(s) ago.  Course of illness is worsening.    Severity moderate  Current and Associated symptoms: cough - productive.    Past Medical History:   Diagnosis Date     Abdominal pain 2010     Constipation 2012     DERMATITIS NOS 10/13/2006      jaundice     admitted for phototherapy     OTITIS MEDIA NOS 10/13/2006     Allergies   Allergen Reactions     Gluten GI Disturbance     Social History     Tobacco Use     Smoking status: Never Smoker     Smokeless tobacco: Never Used     Tobacco comment: non smoking home   Substance Use Topics     Alcohol use: No       ROS:  SKIN: no rash  GI: no vomiting    OBJECTIVE:  /62   Pulse 89   Temp 97.5  F (36.4  C) (Tympanic)   Resp 16   Wt 49.9 kg (110 lb)   SpO2 97%   BMI 18.30 kg/m  GENERAL APPEARANCE: healthy, alert and no distress  EYES: EOMI,  PERRL, conjunctiva clear  HENT: ear canals and TM's normal.  Nose and mouth without ulcers, erythema or lesions  RESP: lungs clear to auscultation - no rales, rhonchi or wheezes  SKIN: no suspicious lesions or rashes    Xray without acute findings, no pneumonia read by Prince Mancini D.O.      ICD-10-CM    1. Productive cough  R05.8 XR Chest 2 Views     azithromycin (ZITHROMAX) 250 MG tablet     albuterol (PROAIR HFA) 108 (90 Base) MCG/ACT inhaler   2. SOB (shortness of breath)  R06.02 albuterol (PROAIR HFA) 108 (90 Base) MCG/ACT inhaler     predniSONE (DELTASONE) 20 MG tablet     Fluids/Rest, f/u if worse/not any better

## 2022-08-01 ENCOUNTER — MYC REFILL (OUTPATIENT)
Dept: PEDIATRICS | Facility: CLINIC | Age: 22
End: 2022-08-01

## 2022-08-01 ENCOUNTER — OFFICE VISIT (OUTPATIENT)
Dept: PEDIATRICS | Facility: CLINIC | Age: 22
End: 2022-08-01
Payer: COMMERCIAL

## 2022-08-01 VITALS
WEIGHT: 111 LBS | HEART RATE: 79 BPM | HEIGHT: 65 IN | SYSTOLIC BLOOD PRESSURE: 101 MMHG | OXYGEN SATURATION: 98 % | DIASTOLIC BLOOD PRESSURE: 65 MMHG | BODY MASS INDEX: 18.49 KG/M2

## 2022-08-01 DIAGNOSIS — J03.01 RECURRENT STREPTOCOCCAL TONSILLITIS: Primary | ICD-10-CM

## 2022-08-01 DIAGNOSIS — F32.2 CURRENT SEVERE EPISODE OF MAJOR DEPRESSIVE DISORDER WITHOUT PSYCHOTIC FEATURES WITHOUT PRIOR EPISODE (H): ICD-10-CM

## 2022-08-01 DIAGNOSIS — Z01.818 PREOP GENERAL PHYSICAL EXAM: ICD-10-CM

## 2022-08-01 DIAGNOSIS — F41.1 GENERALIZED ANXIETY DISORDER: ICD-10-CM

## 2022-08-01 PROCEDURE — 99214 OFFICE O/P EST MOD 30 MIN: CPT | Performed by: PEDIATRICS

## 2022-08-01 ASSESSMENT — PATIENT HEALTH QUESTIONNAIRE - PHQ9
SUM OF ALL RESPONSES TO PHQ QUESTIONS 1-9: 11
10. IF YOU CHECKED OFF ANY PROBLEMS, HOW DIFFICULT HAVE THESE PROBLEMS MADE IT FOR YOU TO DO YOUR WORK, TAKE CARE OF THINGS AT HOME, OR GET ALONG WITH OTHER PEOPLE: SOMEWHAT DIFFICULT
SUM OF ALL RESPONSES TO PHQ QUESTIONS 1-9: 11

## 2022-08-01 NOTE — PROGRESS NOTES
[facility]  [address] [address]  Phone: [phone]  Fax: [phone]  Primary Provider: Nithya Mohamud  Pre-op Performing Provider: JAKE KWOK MD      PREOPERATIVE EVALUATION:  Today's date: 8/1/2022    Karishma Tyson is a 21 year old female who presents for a preoperative evaluation.    Surgical Information:  Surgery/Procedure: TONSILLECTOMY  Surgery Location: [facility]  Surgeon: Fabian Zee MD  Surgery Date: 08/16/2022  Time of Surgery: 1:45 PM  Where patient plans to recover: At home with family  Fax number for surgical facility: Note does not need to be faxed, will be available electronically in Epic.    Type of Anesthesia Anticipated: General    Assessment & Plan     The proposed surgical procedure is considered LOW risk.    Recurrent streptococcal tonsillitis      Preop general physical exam      RECOMMENDATION:  APPROVAL GIVEN to proceed with proposed procedure, without further diagnostic evaluation.    Subjective     HPI related to upcoming procedure: tonsillectomy     Patient has has had a previous surgery to take her wisdom teeth out. It went well. Patient does not have history of unusual bleeding or bruising. No family history of unusual bleeding, bruising, or problems with anesthesia. No known exposure to any illnesses, and patient has otherwise been healthy, but is a little congested today.      Preop Questions 8/1/2022   1. Have you ever had a heart attack or stroke? No   2. Have you ever had surgery on your heart or blood vessels, such as a stent placement, a coronary artery bypass, or surgery on an artery in your head, neck, heart, or legs? No   3. Do you have chest pain with activity? No   4. Do you have a history of  heart failure? No   5. Do you currently have a cold, bronchitis or symptoms of other infection? UNKNOWN - congested in clinic 8/1/22   6. Do you have a cough, shortness of breath, or  wheezing? YES - congested in clinic 8/1/22   7. Do you or anyone in your family have previous history of blood clots? UNKNOWN    8. Do you or does anyone in your family have a serious bleeding problem such as prolonged bleeding following surgeries or cuts? No   9. Have you ever had problems with anemia or been told to take iron pills? No   10. Have you had any abnormal blood loss such as black, tarry or bloody stools, or abnormal vaginal bleeding? No   11. Have you ever had a blood transfusion? No   12. Are you willing to have a blood transfusion if it is medically needed before, during, or after your surgery? Yes   13. Have you or any of your relatives ever had problems with anesthesia? No   14. Do you have sleep apnea, excessive snoring or daytime drowsiness? No   15. Do you have any artifical heart valves or other implanted medical devices like a pacemaker, defibrillator, or continuous glucose monitor? No   16. Do you have artificial joints? No   17. Are you allergic to latex? No   18. Is there any chance that you may be pregnant? No       Health Care Directive:  Patient does not have a Health Care Directive or Living Will    Preoperative Review of :   reviewed - no record of controlled substances prescribed.      Review of Systems  Constitutional, eye, ENT, skin, respiratory, cardiac, and GI are normal except as otherwise noted.    PSFH:  No recent change to medical, surgical, family, or social history.      Patient Active Problem List    Diagnosis Date Noted     Anorexia nervosa 04/21/2019     Priority: High     Recurrent streptococcal tonsillitis 07/01/2022     Priority: Medium     Added automatically from request for surgery [id number]       Dysmenorrhea 10/30/2020     Priority: Medium     Attention deficit hyperactivity disorder (ADHD), combined type 12/10/2019     Priority: Medium     Current severe episode of major depressive disorder without psychotic features without prior episode (H) 02/26/2019      Priority: Medium     Generalized anxiety disorder 2018     Priority: Medium     Contact dermatitis and other eczema, due to unspecified cause 10/13/2006     Priority: Medium      Past Medical History:   Diagnosis Date     Abdominal pain 2010     Constipation 2012     DERMATITIS NOS 10/13/2006      jaundice     admitted for phototherapy     OTITIS MEDIA NOS 10/13/2006     Past Surgical History:   Procedure Laterality Date     BIOPSY RECTUM  2012    Procedure: BIOPSY RECTUM;;  Surgeon: Cleveland Mccann MD;  [facility]     EXAM UNDER ANESTHESIA RECTUM  2012    Procedure: EXAM UNDER ANESTHESIA RECTUM;  Rectal Exam Under Anesthesia, Rectal Biopsy ;  Surgeon: Cleveland Mccann MD;  Location: [facility]     UPPER GI ENDOSCOPY       Current Outpatient Medications   Medication Sig Dispense Refill     albuterol (PROAIR HFA) 108 (90 Base) MCG/ACT inhaler Inhale 2 puffs into the lungs every 6 hours for 30 days 18 g 0     amphetamine-dextroamphetamine (ADDERALL XR) 10 MG 24 hr capsule        escitalopram (LEXAPRO) 20 MG tablet Take 1 tablet (20 mg) by mouth daily 30 tablet 0     multivitamin w/minerals (THERA-VIT-M) tablet Take 1 tablet by mouth daily       azithromycin (ZITHROMAX) 250 MG tablet Take 2 tablets (500 mg) by mouth daily for 1 day, THEN 1 tablet (250 mg) daily for 4 days. (Patient not taking: Reported on 2022) 6 tablet 0     busPIRone (BUSPAR) 10 MG tablet Take 1 tablet (10 mg) by mouth 2 times daily (Patient not taking: No sig reported) 180 tablet 0     clotrimazole-betamethasone (LOTRISONE) 1-0.05 % external cream Apply topically 2 times daily (Patient not taking: No sig reported) 45 g 0     etonogestrel-ethinyl estradiol (NUVARING) 0.12-0.015 MG/24HR vaginal ring Place 1 each vaginally every 28 days (Patient not taking: No sig reported) 3 each 3     mupirocin (BACTROBAN) 2 % external ointment Apply topically 3 times daily (Patient not taking: No sig reported)  "30 g 0     predniSONE (DELTASONE) 20 MG tablet Take 1 tablet (20 mg) by mouth 2 times daily for 5 days (Patient not taking: Reported on 8/1/2022) 10 tablet 0       Allergies   Allergen Reactions     Gluten GI Disturbance        Social History     Tobacco Use     Smoking status: Never Smoker     Smokeless tobacco: Never Used     Tobacco comment: non smoking home   Substance Use Topics     Alcohol use: No     Family History   Problem Relation Age of Onset     Depression Mother      Gastrointestinal Disease Mother         IBS     Neurologic Disorder Father         seizures secondary to TBI     Gastrointestinal Disease Maternal Grandmother         gallstones     Gastrointestinal Disease Maternal Grandfather         similar abdominal pain and constipation as Karishma, no known cause     Heart Disease Maternal Grandfather         mitral valve prolapse     Arthritis Maternal Grandfather         rheumatoid arthritis     Psychotic Disorder Paternal Grandmother      Gastrointestinal Disease Paternal Grandmother         H. Pylori     Gastrointestinal Disease Paternal Grandfather         colitis     Heart Surgery Paternal Grandfather 76     History   Drug Use No         Objective     /65 (BP Location: Right arm, Patient Position: Sitting, Cuff Size: Adult Regular)   Pulse 79   Ht 5' 5\" (1.651 m)   Wt 111 lb (50.3 kg)   SpO2 98%   BMI 18.47 kg/m      Physical Exam  Alert, no acute distress.   HEENT, conjunctivae are clear, TMs are without erythema, pus or fluid. Position and landmarks are normal.  Nose is clear.  Oropharynx is moist and clear, without tonsillar hypertrophy, asymmetry, exudate or lesions.  Neck is supple without adenopathy or thyromegaly.  Lungs have good air entry bilaterally, no wheezes or crackles.  No prolongation of expiratory phase.   No tachypnea, retractions, or increased work of breathing.  Cardiac exam regular rate and rhythm, normal S1 and S2.  Abdomen is soft and nontender, bowel sounds are " present, no hepatosplenomegaly or mass palpable.  Skin, clear without rash      Recent Labs   Lab Test 01/26/22  1146 12/06/21  1321   HGB 13.2 12.8    243     --    POTASSIUM 3.5  --    CR 0.64  --         Diagnostics:  No labs were ordered during this visit.   No EKG required for low risk surgery (cataract, skin procedure, breast biopsy, etc).    Revised Cardiac Risk Index (RCRI):  The patient has the following serious cardiovascular risks for perioperative complications:   - No serious cardiac risks = 0 points     RCRI Interpretation: 0 points: Class I (very low risk - 0.4% complication rate)         Signed Electronically by: Cleo Putnam MD  Copy of this evaluation report is provided to requesting physician.

## 2022-08-01 NOTE — H&P (VIEW-ONLY)
[facility]  [address] [address]  Phone: [phone]  Fax: [phone]  Primary Provider: Nithya Mohamud  Pre-op Performing Provider: JAKE KWOK MD      PREOPERATIVE EVALUATION:  Today's date: 8/1/2022    Karishma Tyson is a 21 year old female who presents for a preoperative evaluation.    Surgical Information:  Surgery/Procedure: TONSILLECTOMY  Surgery Location: [facility]  Surgeon: Fabian Zee MD  Surgery Date: 08/16/2022  Time of Surgery: 1:45 PM  Where patient plans to recover: At home with family  Fax number for surgical facility: Note does not need to be faxed, will be available electronically in Epic.    Type of Anesthesia Anticipated: General    Assessment & Plan     The proposed surgical procedure is considered LOW risk.    Recurrent streptococcal tonsillitis      Preop general physical exam      RECOMMENDATION:  APPROVAL GIVEN to proceed with proposed procedure, without further diagnostic evaluation.    Subjective     HPI related to upcoming procedure: tonsillectomy     Patient has has had a previous surgery to take her wisdom teeth out. It went well. Patient does not have history of unusual bleeding or bruising. No family history of unusual bleeding, bruising, or problems with anesthesia. No known exposure to any illnesses, and patient has otherwise been healthy, but is a little congested today.      Preop Questions 8/1/2022   1. Have you ever had a heart attack or stroke? No   2. Have you ever had surgery on your heart or blood vessels, such as a stent placement, a coronary artery bypass, or surgery on an artery in your head, neck, heart, or legs? No   3. Do you have chest pain with activity? No   4. Do you have a history of  heart failure? No   5. Do you currently have a cold, bronchitis or symptoms of other infection? UNKNOWN - congested in clinic 8/1/22   6. Do you have a cough, shortness of breath, or  wheezing? YES - congested in clinic 8/1/22   7. Do you or anyone in your family have previous history of blood clots? UNKNOWN    8. Do you or does anyone in your family have a serious bleeding problem such as prolonged bleeding following surgeries or cuts? No   9. Have you ever had problems with anemia or been told to take iron pills? No   10. Have you had any abnormal blood loss such as black, tarry or bloody stools, or abnormal vaginal bleeding? No   11. Have you ever had a blood transfusion? No   12. Are you willing to have a blood transfusion if it is medically needed before, during, or after your surgery? Yes   13. Have you or any of your relatives ever had problems with anesthesia? No   14. Do you have sleep apnea, excessive snoring or daytime drowsiness? No   15. Do you have any artifical heart valves or other implanted medical devices like a pacemaker, defibrillator, or continuous glucose monitor? No   16. Do you have artificial joints? No   17. Are you allergic to latex? No   18. Is there any chance that you may be pregnant? No       Health Care Directive:  Patient does not have a Health Care Directive or Living Will    Preoperative Review of :   reviewed - no record of controlled substances prescribed.      Review of Systems  Constitutional, eye, ENT, skin, respiratory, cardiac, and GI are normal except as otherwise noted.    PSFH:  No recent change to medical, surgical, family, or social history.      Patient Active Problem List    Diagnosis Date Noted     Anorexia nervosa 04/21/2019     Priority: High     Recurrent streptococcal tonsillitis 07/01/2022     Priority: Medium     Added automatically from request for surgery [id number]       Dysmenorrhea 10/30/2020     Priority: Medium     Attention deficit hyperactivity disorder (ADHD), combined type 12/10/2019     Priority: Medium     Current severe episode of major depressive disorder without psychotic features without prior episode (H) 02/26/2019      Priority: Medium     Generalized anxiety disorder 2018     Priority: Medium     Contact dermatitis and other eczema, due to unspecified cause 10/13/2006     Priority: Medium      Past Medical History:   Diagnosis Date     Abdominal pain 2010     Constipation 2012     DERMATITIS NOS 10/13/2006      jaundice     admitted for phototherapy     OTITIS MEDIA NOS 10/13/2006     Past Surgical History:   Procedure Laterality Date     BIOPSY RECTUM  2012    Procedure: BIOPSY RECTUM;;  Surgeon: Cleveland Mccann MD;  [facility]     EXAM UNDER ANESTHESIA RECTUM  2012    Procedure: EXAM UNDER ANESTHESIA RECTUM;  Rectal Exam Under Anesthesia, Rectal Biopsy ;  Surgeon: Cleveland Mccann MD;  [facility]     UPPER GI ENDOSCOPY       Current Outpatient Medications   Medication Sig Dispense Refill     albuterol (PROAIR HFA) 108 (90 Base) MCG/ACT inhaler Inhale 2 puffs into the lungs every 6 hours for 30 days 18 g 0     amphetamine-dextroamphetamine (ADDERALL XR) 10 MG 24 hr capsule        escitalopram (LEXAPRO) 20 MG tablet Take 1 tablet (20 mg) by mouth daily 30 tablet 0     multivitamin w/minerals (THERA-VIT-M) tablet Take 1 tablet by mouth daily       azithromycin (ZITHROMAX) 250 MG tablet Take 2 tablets (500 mg) by mouth daily for 1 day, THEN 1 tablet (250 mg) daily for 4 days. (Patient not taking: Reported on 2022) 6 tablet 0     busPIRone (BUSPAR) 10 MG tablet Take 1 tablet (10 mg) by mouth 2 times daily (Patient not taking: No sig reported) 180 tablet 0     clotrimazole-betamethasone (LOTRISONE) 1-0.05 % external cream Apply topically 2 times daily (Patient not taking: No sig reported) 45 g 0     etonogestrel-ethinyl estradiol (NUVARING) 0.12-0.015 MG/24HR vaginal ring Place 1 each vaginally every 28 days (Patient not taking: No sig reported) 3 each 3     mupirocin (BACTROBAN) 2 % external ointment Apply topically 3 times daily (Patient not taking: No sig reported)  "30 g 0     predniSONE (DELTASONE) 20 MG tablet Take 1 tablet (20 mg) by mouth 2 times daily for 5 days (Patient not taking: Reported on 8/1/2022) 10 tablet 0       Allergies   Allergen Reactions     Gluten GI Disturbance        Social History     Tobacco Use     Smoking status: Never Smoker     Smokeless tobacco: Never Used     Tobacco comment: non smoking home   Substance Use Topics     Alcohol use: No     Family History   Problem Relation Age of Onset     Depression Mother      Gastrointestinal Disease Mother         IBS     Neurologic Disorder Father         seizures secondary to TBI     Gastrointestinal Disease Maternal Grandmother         gallstones     Gastrointestinal Disease Maternal Grandfather         similar abdominal pain and constipation as Karishma, no known cause     Heart Disease Maternal Grandfather         mitral valve prolapse     Arthritis Maternal Grandfather         rheumatoid arthritis     Psychotic Disorder Paternal Grandmother      Gastrointestinal Disease Paternal Grandmother         H. Pylori     Gastrointestinal Disease Paternal Grandfather         colitis     Heart Surgery Paternal Grandfather 76     History   Drug Use No         Objective     /65 (BP Location: Right arm, Patient Position: Sitting, Cuff Size: Adult Regular)   Pulse 79   Ht 5' 5\" (1.651 m)   Wt 111 lb (50.3 kg)   SpO2 98%   BMI 18.47 kg/m      Physical Exam  Alert, no acute distress.   HEENT, conjunctivae are clear, TMs are without erythema, pus or fluid. Position and landmarks are normal.  Nose is clear.  Oropharynx is moist and clear, without tonsillar hypertrophy, asymmetry, exudate or lesions.  Neck is supple without adenopathy or thyromegaly.  Lungs have good air entry bilaterally, no wheezes or crackles.  No prolongation of expiratory phase.   No tachypnea, retractions, or increased work of breathing.  Cardiac exam regular rate and rhythm, normal S1 and S2.  Abdomen is soft and nontender, bowel sounds are " present, no hepatosplenomegaly or mass palpable.  Skin, clear without rash      Recent Labs   Lab Test 01/26/22  1146 12/06/21  1321   HGB 13.2 12.8    243     --    POTASSIUM 3.5  --    CR 0.64  --         Diagnostics:  No labs were ordered during this visit.   No EKG required for low risk surgery (cataract, skin procedure, breast biopsy, etc).    Revised Cardiac Risk Index (RCRI):  The patient has the following serious cardiovascular risks for perioperative complications:   - No serious cardiac risks = 0 points     RCRI Interpretation: 0 points: Class I (very low risk - 0.4% complication rate)         Signed Electronically by: Cleo Putnam MD  Copy of this evaluation report is provided to requesting physician.

## 2022-08-04 RX ORDER — ESCITALOPRAM OXALATE 20 MG/1
20 TABLET ORAL DAILY
Qty: 30 TABLET | Refills: 0 | Status: SHIPPED | OUTPATIENT
Start: 2022-08-04 | End: 2022-09-27

## 2022-08-04 NOTE — TELEPHONE ENCOUNTER
Routing refill request to provider for review/approval because:  Elevated PHQ9    PHQ-9 score:    PHQ 8/1/2022   PHQ-9 Total Score 11   Q9: Thoughts of better off dead/self-harm past 2 weeks Not at all   F/U: Thoughts of suicide or self-harm -   F/U: Self harm-plan -   F/U: Self-harm action -   F/U: Safety concerns -   PHQ-A Total Score -   PHQ-A Depressed most days in past year -   PHQ-A Mood affect on daily activities -   PHQ-A Suicide Ideation past 2 weeks -   PHQ-A Suicide Ideation past month -   PHQ-A Previous suicide attempt -     Stephanie Goldstein RN on 8/4/2022 at 3:31 PM

## 2022-08-04 NOTE — TELEPHONE ENCOUNTER
Update phq and heraclio, have her do a follow up evisit   Detail Level: Detailed Size Of Lesion: 3mm Size Of Lesion: 4mm

## 2022-08-14 ENCOUNTER — ANESTHESIA EVENT (OUTPATIENT)
Dept: SURGERY | Facility: AMBULATORY SURGERY CENTER | Age: 22
End: 2022-08-14
Payer: COMMERCIAL

## 2022-08-16 ENCOUNTER — HOSPITAL ENCOUNTER (OUTPATIENT)
Facility: AMBULATORY SURGERY CENTER | Age: 22
Discharge: HOME | End: 2022-08-16
Attending: OTOLARYNGOLOGY
Payer: COMMERCIAL

## 2022-08-16 ENCOUNTER — ANESTHESIA (OUTPATIENT)
Dept: SURGERY | Facility: AMBULATORY SURGERY CENTER | Age: 22
End: 2022-08-16
Payer: COMMERCIAL

## 2022-08-16 VITALS
BODY MASS INDEX: 18.49 KG/M2 | WEIGHT: 111 LBS | TEMPERATURE: 97.7 F | SYSTOLIC BLOOD PRESSURE: 116 MMHG | OXYGEN SATURATION: 98 % | HEIGHT: 65 IN | RESPIRATION RATE: 16 BRPM | HEART RATE: 89 BPM | DIASTOLIC BLOOD PRESSURE: 66 MMHG

## 2022-08-16 DIAGNOSIS — Z90.89 S/P TONSILLECTOMY: Primary | ICD-10-CM

## 2022-08-16 DIAGNOSIS — J03.01 RECURRENT STREPTOCOCCAL TONSILLITIS: ICD-10-CM

## 2022-08-16 PROCEDURE — 42826 REMOVAL OF TONSILS: CPT | Performed by: OTOLARYNGOLOGY

## 2022-08-16 RX ORDER — ONDANSETRON 2 MG/ML
INJECTION INTRAMUSCULAR; INTRAVENOUS PRN
Status: DISCONTINUED | OUTPATIENT
Start: 2022-08-16 | End: 2022-08-16

## 2022-08-16 RX ORDER — LIDOCAINE 40 MG/G
CREAM TOPICAL
Status: DISCONTINUED | OUTPATIENT
Start: 2022-08-16 | End: 2022-08-17 | Stop reason: HOSPADM

## 2022-08-16 RX ORDER — HYDROMORPHONE HCL IN WATER/PF 6 MG/30 ML
0.2 PATIENT CONTROLLED ANALGESIA SYRINGE INTRAVENOUS EVERY 5 MIN PRN
Status: DISCONTINUED | OUTPATIENT
Start: 2022-08-16 | End: 2022-08-17 | Stop reason: HOSPADM

## 2022-08-16 RX ORDER — OXYCODONE HCL 5 MG/5 ML
5 SOLUTION, ORAL ORAL EVERY 6 HOURS PRN
Qty: 100 ML | Refills: 0 | Status: SHIPPED | OUTPATIENT
Start: 2022-08-16 | End: 2022-08-21

## 2022-08-16 RX ORDER — HYDROCODONE BITARTRATE AND ACETAMINOPHEN 7.5; 325 MG/15ML; MG/15ML
10 SOLUTION ORAL
Status: DISCONTINUED | OUTPATIENT
Start: 2022-08-16 | End: 2022-08-17 | Stop reason: HOSPADM

## 2022-08-16 RX ORDER — ACETAMINOPHEN 325 MG/1
975 TABLET ORAL ONCE
Status: COMPLETED | OUTPATIENT
Start: 2022-08-16 | End: 2022-08-16

## 2022-08-16 RX ORDER — HALOPERIDOL 5 MG/ML
1 INJECTION INTRAMUSCULAR EVERY 6 HOURS PRN
Status: DISCONTINUED | OUTPATIENT
Start: 2022-08-16 | End: 2022-08-17 | Stop reason: HOSPADM

## 2022-08-16 RX ORDER — NEOSTIGMINE METHYLSULFATE 1 MG/ML
VIAL (ML) INJECTION PRN
Status: DISCONTINUED | OUTPATIENT
Start: 2022-08-16 | End: 2022-08-16

## 2022-08-16 RX ORDER — OXYCODONE HYDROCHLORIDE 5 MG/1
5 TABLET ORAL EVERY 4 HOURS PRN
Status: DISCONTINUED | OUTPATIENT
Start: 2022-08-16 | End: 2022-08-17 | Stop reason: HOSPADM

## 2022-08-16 RX ORDER — ACETAMINOPHEN 325 MG/1
650 TABLET ORAL
Status: DISCONTINUED | OUTPATIENT
Start: 2022-08-16 | End: 2022-08-17 | Stop reason: HOSPADM

## 2022-08-16 RX ORDER — ACETAMINOPHEN 160 MG/5ML
12 LIQUID ORAL
Qty: 507.75 ML | Refills: 0 | Status: SHIPPED | OUTPATIENT
Start: 2022-08-16 | End: 2022-08-21

## 2022-08-16 RX ORDER — LIDOCAINE HYDROCHLORIDE 10 MG/ML
INJECTION, SOLUTION INFILTRATION; PERINEURAL PRN
Status: DISCONTINUED | OUTPATIENT
Start: 2022-08-16 | End: 2022-08-16

## 2022-08-16 RX ORDER — ONDANSETRON 2 MG/ML
4 INJECTION INTRAMUSCULAR; INTRAVENOUS EVERY 30 MIN PRN
Status: DISCONTINUED | OUTPATIENT
Start: 2022-08-16 | End: 2022-08-17 | Stop reason: HOSPADM

## 2022-08-16 RX ORDER — FENTANYL CITRATE 0.05 MG/ML
25 INJECTION, SOLUTION INTRAMUSCULAR; INTRAVENOUS EVERY 5 MIN PRN
Status: DISCONTINUED | OUTPATIENT
Start: 2022-08-16 | End: 2022-08-17 | Stop reason: HOSPADM

## 2022-08-16 RX ORDER — SODIUM CHLORIDE, SODIUM LACTATE, POTASSIUM CHLORIDE, CALCIUM CHLORIDE 600; 310; 30; 20 MG/100ML; MG/100ML; MG/100ML; MG/100ML
INJECTION, SOLUTION INTRAVENOUS CONTINUOUS
Status: DISCONTINUED | OUTPATIENT
Start: 2022-08-16 | End: 2022-08-17 | Stop reason: HOSPADM

## 2022-08-16 RX ORDER — IBUPROFEN 100 MG/5ML
7.5 SUSPENSION, ORAL (FINAL DOSE FORM) ORAL EVERY 6 HOURS
Qty: 400 ML | Refills: 0 | Status: SHIPPED | OUTPATIENT
Start: 2022-08-16 | End: 2022-08-21

## 2022-08-16 RX ORDER — PROPOFOL 10 MG/ML
INJECTION, EMULSION INTRAVENOUS PRN
Status: DISCONTINUED | OUTPATIENT
Start: 2022-08-16 | End: 2022-08-16

## 2022-08-16 RX ORDER — ONDANSETRON 4 MG/1
4 TABLET, ORALLY DISINTEGRATING ORAL EVERY 30 MIN PRN
Status: DISCONTINUED | OUTPATIENT
Start: 2022-08-16 | End: 2022-08-17 | Stop reason: HOSPADM

## 2022-08-16 RX ORDER — FENTANYL CITRATE 50 UG/ML
INJECTION, SOLUTION INTRAMUSCULAR; INTRAVENOUS PRN
Status: DISCONTINUED | OUTPATIENT
Start: 2022-08-16 | End: 2022-08-16

## 2022-08-16 RX ORDER — DEXAMETHASONE SODIUM PHOSPHATE 4 MG/ML
INJECTION, SOLUTION INTRA-ARTICULAR; INTRALESIONAL; INTRAMUSCULAR; INTRAVENOUS; SOFT TISSUE PRN
Status: DISCONTINUED | OUTPATIENT
Start: 2022-08-16 | End: 2022-08-16

## 2022-08-16 RX ORDER — PROPOFOL 10 MG/ML
INJECTION, EMULSION INTRAVENOUS CONTINUOUS PRN
Status: DISCONTINUED | OUTPATIENT
Start: 2022-08-16 | End: 2022-08-16

## 2022-08-16 RX ORDER — FENTANYL CITRATE 0.05 MG/ML
25 INJECTION, SOLUTION INTRAMUSCULAR; INTRAVENOUS
Status: DISCONTINUED | OUTPATIENT
Start: 2022-08-16 | End: 2022-08-17 | Stop reason: HOSPADM

## 2022-08-16 RX ORDER — GLYCOPYRROLATE 0.2 MG/ML
INJECTION, SOLUTION INTRAMUSCULAR; INTRAVENOUS PRN
Status: DISCONTINUED | OUTPATIENT
Start: 2022-08-16 | End: 2022-08-16

## 2022-08-16 RX ORDER — IBUPROFEN 400 MG/1
400 TABLET, FILM COATED ORAL ONCE
Status: COMPLETED | OUTPATIENT
Start: 2022-08-16 | End: 2022-08-16

## 2022-08-16 RX ORDER — MEPERIDINE HYDROCHLORIDE 25 MG/ML
12.5 INJECTION INTRAMUSCULAR; INTRAVENOUS; SUBCUTANEOUS
Status: DISCONTINUED | OUTPATIENT
Start: 2022-08-16 | End: 2022-08-17 | Stop reason: HOSPADM

## 2022-08-16 RX ORDER — MAGNESIUM SULFATE HEPTAHYDRATE 40 MG/ML
4 INJECTION, SOLUTION INTRAVENOUS ONCE
Status: COMPLETED | OUTPATIENT
Start: 2022-08-16 | End: 2022-08-16

## 2022-08-16 RX ADMIN — HALOPERIDOL 1 MG: 5 INJECTION INTRAMUSCULAR at 14:52

## 2022-08-16 RX ADMIN — DEXAMETHASONE SODIUM PHOSPHATE 10 MG: 4 INJECTION, SOLUTION INTRA-ARTICULAR; INTRALESIONAL; INTRAMUSCULAR; INTRAVENOUS; SOFT TISSUE at 13:54

## 2022-08-16 RX ADMIN — GLYCOPYRROLATE 0.2 MG: 0.2 INJECTION, SOLUTION INTRAMUSCULAR; INTRAVENOUS at 13:54

## 2022-08-16 RX ADMIN — IBUPROFEN 400 MG: 400 TABLET, FILM COATED ORAL at 15:21

## 2022-08-16 RX ADMIN — OXYCODONE HYDROCHLORIDE 5 MG: 5 TABLET ORAL at 15:21

## 2022-08-16 RX ADMIN — SODIUM CHLORIDE, SODIUM LACTATE, POTASSIUM CHLORIDE, CALCIUM CHLORIDE: 600; 310; 30; 20 INJECTION, SOLUTION INTRAVENOUS at 13:23

## 2022-08-16 RX ADMIN — Medication 2.5 MG: at 14:23

## 2022-08-16 RX ADMIN — PROPOFOL 180 MCG/KG/MIN: 10 INJECTION, EMULSION INTRAVENOUS at 13:56

## 2022-08-16 RX ADMIN — ONDANSETRON 4 MG: 2 INJECTION INTRAMUSCULAR; INTRAVENOUS at 14:40

## 2022-08-16 RX ADMIN — GLYCOPYRROLATE 0.4 MG: 0.2 INJECTION, SOLUTION INTRAMUSCULAR; INTRAVENOUS at 14:23

## 2022-08-16 RX ADMIN — PROPOFOL 160 MG: 10 INJECTION, EMULSION INTRAVENOUS at 13:54

## 2022-08-16 RX ADMIN — ONDANSETRON 4 MG: 2 INJECTION INTRAMUSCULAR; INTRAVENOUS at 13:54

## 2022-08-16 RX ADMIN — FENTANYL CITRATE 100 MCG: 50 INJECTION, SOLUTION INTRAMUSCULAR; INTRAVENOUS at 13:54

## 2022-08-16 RX ADMIN — MAGNESIUM SULFATE HEPTAHYDRATE 4 G: 40 INJECTION, SOLUTION INTRAVENOUS at 13:24

## 2022-08-16 RX ADMIN — ACETAMINOPHEN 975 MG: 325 TABLET ORAL at 13:15

## 2022-08-16 RX ADMIN — LIDOCAINE HYDROCHLORIDE 5 ML: 10 INJECTION, SOLUTION INFILTRATION; PERINEURAL at 13:54

## 2022-08-16 RX ADMIN — Medication 20 MG: at 13:54

## 2022-08-16 NOTE — ANESTHESIA PROCEDURE NOTES
Airway       Patient location during procedure: OR       Procedure Start/Stop Times: 8/16/2022 1:56 PM  Staff -        Anesthesiologist:  Ayden Shah MD       CRNA: Trang Leonardo APRN CRNA       Performed By: CRNAIndications and Patient Condition       Indications for airway management: wagner-procedural       Induction type:intravenous       Mask difficulty assessment: 1 - vent by mask    Final Airway Details       Final airway type: endotracheal airway       Successful airway: ETT - single, Oral and ROSI  Endotracheal Airway Details        ETT size (mm): 7.0       Cuffed: yes       Successful intubation technique: direct laryngoscopy       DL Blade Type: Myers 2       Grade View of Cords: 1       Adjucts: stylet and tooth guard       Position: Center       Measured from: gums/teeth       Secured at (cm): 21       Bite block used: None    Post intubation assessment        Placement verified by: capnometry, equal breath sounds and chest rise        Number of attempts at approach: 1       Secured with: silk tape       Ease of procedure: easy       Dentition: Intact and Unchanged    Medication(s) Administered   Medication Administration Time: 8/16/2022 1:56 PM

## 2022-08-16 NOTE — DISCHARGE INSTRUCTIONS
If you have any questions or concerns regarding your procedure, please contact Dr. Zee, his office number is [phone].      You have received 975 mg of Acetaminophen (Tylenol) at 1:15pm. Please do not take an additional dose of Tylenol until after 7:15pm       Do not exceed 4,000 mg of acetaminophen during a 24 hour period and keep in mind that acetaminophen can also be found in many over-the-counter cold medications as well as narcotics that may be given for pain.     Take tylenol and ibuprofen every 6 hours as your main form of pain control.     Do not exceed 4,000 mg of acetaminophen during a 24 hour period  or 1000 mg per dose. Keep in mind that acetaminophen can also be found in many over-the-counter cold medications as well as narcotics that may be given for pain.     Senna-Docusate (Stool Softener): Next dose: When you get home. Take as instructed on the bottle. This is an over-the-counter medication. Take this while taking narcotic medications to prevent constipation.      Use tylenol every 4 hours while awake 1 week  Motrin every 6 hours while awake 1 week  Oxycodone every 4 hours prn for breakthrough pain  Manuka honey 1 teaspoon diluted in warm water 3x daily for 10 days  Go to emergency room if you have bright red blood in your mouth  Soft diet (no foods with sharp edges) for 14 days  It may be helpful to sleep with your head elevated with several pillows for the first 3 nights to help with swelling     Discharge Instructions: After Your Surgery    You ve just had surgery. During surgery, you were given medicine called anesthesia to keep you relaxed and free of pain. After surgery, you may have some pain or nausea. This is common. Here are some tips for feeling better and getting well after surgery.    Going home    Your healthcare provider will show you how to take care of yourself when you go home. He or she will also answer your questions. Have an adult family member or friend drive you home.  For the first 24 hours after your surgery:  Don't drive or use heavy equipment.  Don't make important decisions or sign legal papers.  Don't drink alcohol.  Have an adult stay with you for 24 hours. He or she can watch for problems and help keep you safe.  Be sure to go to all follow-up visits with your healthcare provider. And rest after your surgery for as long as your healthcare provider tells you to.    Coping with pain    If you have pain after surgery, pain medicine will help you feel better. Take it as told, before pain becomes severe. Also, ask your healthcare provider or pharmacist about other ways to control pain. This might be with heat, ice, or relaxation. And follow any other instructions your surgeon or nurse gives you.    Tips for taking pain medicine    To get the best relief possible, remember these points:  Pain medicines can upset your stomach. Taking them with a little food may help.  Most pain relievers taken by mouth need at least 20 to 30 minutes to start to work.  Don't wait till your pain becomes severe before you take your medicine. Try to time your medicine so that you can take it before starting an activity. This might be before you get dressed, go for a walk, or sit down for dinner.  Constipation is a common side effect of pain medicines. It's ok to take medicines such as laxatives or stool softeners to help ease constipation. Also ask if you should skip any foods. Drinking lots of fluids and eating foods such as fruits and vegetables that are high in fiber can also help.  Drinking alcohol and taking pain medicine can cause dizziness and slow your breathing. It can even be deadly. Don't drink alcohol while taking pain medicine.  Pain medicine can make you react more slowly to things. Don't drive or run machinery while taking pain medicine.  Your healthcare provider may tell you to take acetaminophen to help ease your pain. Ask him or her how much you are supposed to take each day.  Acetaminophen or other pain relievers may interact with your prescription medicines or other over-the-counter (OTC) medicines. Some prescription medicines have acetaminophen and other ingredients. Using both prescription and OTC acetaminophen for pain can cause you to overdose. Read the labels on your OTC medicines with care. This will help you to clearly know the list of ingredients, how much to take, and any warnings. It may also help you not take too much acetaminophen. If you have questions or don't understand the information, ask your pharmacist or healthcare provider to explain it to you before you take the OTC medicine.    Managing nausea    Some people have an upset stomach after surgery. This is often because of anesthesia, pain, or pain medicine, or the stress of surgery. These tips will help you handle nausea and eat healthy foods as you get better. If you were on a special food plan before surgery, ask your healthcare provider if you should follow it while you get better. These tips may help:    Don't push yourself to eat. Your body will tell you when to eat and how much.  Start off with clear liquids and soup. They are easier to digest.  Next try semi-solid foods, such as mashed potatoes, applesauce, and gelatin, as you feel ready.  Slowly move to solid foods. Don t eat fatty, rich, or spicy foods at first.  Don't force yourself to have 3 large meals a day. Instead eat smaller amounts more often.  Take pain medicines with a small amount of solid food, such as crackers or toast, to prevent nausea.    When to call your healthcare provider    Call your healthcare provider if:  You still have intolerable pain an hour after taking medicine. The medicine may not be strong enough.  You feel too sleepy, dizzy, or groggy. The medicine may be too strong.  You have side effects such as nausea or vomiting, or skin changes such as rash, itching, or hives. Your healthcare provider may suggest other medicines to  control side effects.  Rash, itching, or hives may mean you have an allergic reaction. Report this right away. If you have trouble breathing or facial swelling, call 911 right away.    If you have obstructive sleep apnea    You were given anesthesia medicine during surgery to keep you comfortable and free of pain. After surgery, you may have more apnea spells because of this medicine and other medicines you were given. The spells may last longer than usual.   At home:  Keep using the continuous positive airway pressure (CPAP) device when you sleep. Unless your healthcare provider tells you not to, use it when you sleep, day or night. CPAP is a common device used to treat obstructive sleep apnea.  Talk with your provider before taking any pain medicine, muscle relaxants, or sedatives. Your provider will tell you about the possible dangers of taking these medicines.

## 2022-08-16 NOTE — ANESTHESIA CARE TRANSFER NOTE
Patient: Karishma Tyson    Procedure: Procedure(s):  TONSILLECTOMY       Diagnosis: Recurrent streptococcal tonsillitis [J03.01]  Diagnosis Additional Information: No value filed.    Anesthesia Type:   General     Note:    Oropharynx: oropharynx clear of all foreign objects and spontaneously breathing  Level of Consciousness: drowsy    Level of Supplemental Oxygen (L/min / FiO2): 6  Independent Airway: airway patency satisfactory and stable  Dentition: dentition unchanged  Vital Signs Stable: post-procedure vital signs reviewed and stable  Report to RN Given: handoff report given  Patient transferred to: PACU    Handoff Report: Identifed the Patient, Identified the Reponsible Provider, Reviewed the pertinent medical history, Discussed the surgical course, Reviewed Intra-OP anesthesia mangement and issues during anesthesia, Set expectations for post-procedure period and Allowed opportunity for questions and acknowledgement of understanding      Vitals:  Vitals Value Taken Time   /61 08/16/22 1437   Temp 36.3  C (97.3  F) 08/16/22 1437   Pulse 93 08/16/22 1437   Resp 16 08/16/22 1437   SpO2 98 % 08/16/22 1437       Electronically Signed By: TAMMY Staples CRNA  August 16, 2022  2:40 PM

## 2022-08-16 NOTE — ANESTHESIA PREPROCEDURE EVALUATION
Anesthesia Pre-Procedure Evaluation    Patient: Karishma Tyson   MRN: [id number] : 2000        Procedure : Procedure(s):  TONSILLECTOMY          Past Medical History:   Diagnosis Date     Abdominal pain 2010     Constipation 2012     DERMATITIS NOS 10/13/2006      jaundice     admitted for phototherapy     OTITIS MEDIA NOS 10/13/2006      Past Surgical History:   Procedure Laterality Date     BIOPSY RECTUM  2012    Procedure: BIOPSY RECTUM;;  Surgeon: Cleveland Mccann MD;  Location: [facility] OR     EXAM UNDER ANESTHESIA RECTUM  2012    Procedure: EXAM UNDER ANESTHESIA RECTUM;  Rectal Exam Under Anesthesia, Rectal Biopsy ;  Surgeon: Cleveland Mccann MD;  Location: [facility] OR     UPPER GI ENDOSCOPY  2010     WISDOM TOOTH EXTRACTION        Allergies   Allergen Reactions     Gluten GI Disturbance     significance      Social History     Tobacco Use     Smoking status: Never Smoker     Smokeless tobacco: Never Used     Tobacco comment: non smoking home   Substance Use Topics     Alcohol use: No      Wt Readings from Last 1 Encounters:   22 50.3 kg (111 lb) (17 %, Z= -0.96)*     * Growth percentiles are based on CDC (Girls, 2-20 Years) data.        Anesthesia Evaluation   Pt has had prior anesthetic.     No history of anesthetic complications       ROS/MED HX  ENT/Pulmonary:  - neg pulmonary ROS     Neurologic:  - neg neurologic ROS     Cardiovascular:  - neg cardiovascular ROS     METS/Exercise Tolerance:     Hematologic:  - neg hematologic  ROS     Musculoskeletal:  - neg musculoskeletal ROS     GI/Hepatic:  - neg GI/hepatic ROS     Renal/Genitourinary:  - neg Renal ROS     Endo:  - neg endo ROS     Psychiatric/Substance Use: Comment: Anxiety/depression/ADHD - neg psychiatric ROS     Infectious Disease:  - neg infectious disease ROS     Malignancy:  - neg malignancy ROS     Other:  - neg other ROS          Physical Exam    Airway  airway exam normal       Mallampati: I   TM distance: > 3 FB   Neck ROM: full   Mouth opening: > 3 cm    Respiratory Devices and Support         Dental  no notable dental history         Cardiovascular   cardiovascular exam normal          Pulmonary   pulmonary exam normal                OUTSIDE LABS:  CBC:   Lab Results   Component Value Date    WBC 5.9 01/26/2022    WBC 5.8 12/06/2021    HGB 13.2 01/26/2022    HGB 12.8 12/06/2021    HCT 39.5 01/26/2022    HCT 38.4 12/06/2021     01/26/2022     12/06/2021     BMP:   Lab Results   Component Value Date     01/26/2022     04/23/2019    POTASSIUM 3.5 01/26/2022    POTASSIUM 3.3 (L) 04/23/2019    CHLORIDE 108 01/26/2022    CHLORIDE 102 04/23/2019    CO2 25 01/26/2022    CO2 23 04/23/2019    BUN 13 01/26/2022    BUN 11 04/23/2019    CR 0.64 01/26/2022    CR 0.66 04/23/2019    GLC 94 01/26/2022    GLC 93 04/23/2019     COAGS: No results found for: PTT, INR, FIBR  POC:   Lab Results   Component Value Date    BGM 80 04/23/2019    HCG Negative 12/05/2012     HEPATIC:   Lab Results   Component Value Date    ALBUMIN 3.8 01/26/2022    PROTTOTAL 7.1 01/26/2022    ALT 20 01/26/2022    AST 13 01/26/2022    ALKPHOS 82 01/26/2022    BILITOTAL 0.5 01/26/2022     OTHER:   Lab Results   Component Value Date    LACT 1.6 04/23/2019    MABEL 8.9 01/26/2022    LIPASE 70 04/23/2019    AMYLASE 67 04/14/2010    TSH 2.10 01/26/2022    CRP 63.6 (H) 04/23/2019    SED 6 04/14/2010       Anesthesia Plan    ASA Status:  2   NPO Status:  NPO Appropriate    Anesthesia Type: General.     - Airway: ETT   Induction: Propofol, Intravenous.   Maintenance: TIVA.        Consents    Anesthesia Plan(s) and associated risks, benefits, and realistic alternatives discussed. Questions answered and patient/representative(s) expressed understanding.    - Discussed:     - Discussed with:  Patient         Postoperative Care    Pain management: Multi-modal analgesia.   PONV prophylaxis: Ondansetron (or other 5HT-3),  Dexamethasone or Solumedrol     Comments:    Other Comments: Reviewed anesthetic options and risks, including risk of dental trauma. Patient agrees to proceed.                 Ayden Shah MD

## 2022-08-16 NOTE — OP NOTE
OTOLARYNGOLOGY OPERATIVE NOTE    PREOPERATIVE DIAGNOSES:     1. Chronic Tonsillitis    POSTOPERATIVE DIAGNOSES: same    PROCEDURE PERFORMED:  Tonsillecotmy (coblation assisted)    SURGEON: Fabian Zee MD  ASSISTANTS: None.  BLOOD LOSS: Less than 10  mL  COMPLICATIONS: None.   SPECIMENS: Tonsils (right pinned) to pathology  ANESTHESIA: GETA.   FINDINGS: 3-4+ Tonsil Hypertrophy (L > R)                OPERATIVE PROCEDURE: After being taken to the operating room and induction of general endotracheal tube anesthesia, a pause was conducted to identify the patient by name, birthday, and procedure.    The bed was then rotated 90 degrees.Then a shoulder roll and head turban were placed. I suspended the patient from the Childers stand using a [employer] mouthgag.     The soft palate was examined and determined to be nomral.    Using the coblation wand, a tonsillectomy was performed in the standard fashion, using a tonsil tenaculum to hold tension medially on each tonsil.   Meticulous hemostasis was then achieved.    Surgicel powder was placed in each tonsillar bed.      An OG tube was then used to clear the esophagus of secretions.    The bed was rotated 90 degrees after I removed the shoulder roll and head turban, and the patient was awakened, extubated and sent to the recovery room in good condition.

## 2022-08-16 NOTE — ANESTHESIA POSTPROCEDURE EVALUATION
Patient: Karishma Tyson    Procedure: Procedure(s):  TONSILLECTOMY       Anesthesia Type:  General    Note:  Disposition: Outpatient   Postop Pain Control: Uneventful            Sign Out: Well controlled pain   PONV: No   Neuro/Psych: Uneventful            Sign Out: Acceptable/Baseline neuro status   Airway/Respiratory: Uneventful            Sign Out: Acceptable/Baseline resp. status   CV/Hemodynamics: Uneventful            Sign Out: Acceptable CV status; No obvious hypovolemia; No obvious fluid overload   Other NRE: NONE   DID A NON-ROUTINE EVENT OCCUR? No    Event details/Postop Comments:  Patient reports being satisfied with her anesthetic care, and reports that her dentition feels unchanged from preop.            Last vitals:  Vitals Value Taken Time   /57 08/16/22 1446   Temp 97.3  F (36.3  C) 08/16/22 1437   Pulse 72 08/16/22 1456   Resp 16 08/16/22 1445   SpO2 98 % 08/16/22 1456   Vitals shown include unvalidated device data.    Electronically Signed By: Ayden Shah MD  August 16, 2022  3:55 PM

## 2022-08-17 NOTE — OR NURSING
Pt sleeping. Father reports she is managing her pain well. encouraged to call Dr. Zee with any questions or concerns.    Kaity Brandt RN on 8/17/2022 at 9:38 AM

## 2022-09-27 ENCOUNTER — MYC REFILL (OUTPATIENT)
Dept: PEDIATRICS | Facility: CLINIC | Age: 22
End: 2022-09-27

## 2022-09-27 DIAGNOSIS — F32.2 CURRENT SEVERE EPISODE OF MAJOR DEPRESSIVE DISORDER WITHOUT PSYCHOTIC FEATURES WITHOUT PRIOR EPISODE (H): ICD-10-CM

## 2022-09-27 DIAGNOSIS — F41.1 GENERALIZED ANXIETY DISORDER: ICD-10-CM

## 2022-09-27 NOTE — LETTER
October 7, 2022      Karishma Tyson  [address] [place] [address]        Dear Karishma,       We care about your health and have reviewed your health plan including your medical conditions, medications, and lab results.  Based on this review, it is recommended that you follow up regarding the following health topic(s):  -You're due for an office visit.     Please call us at the [facility] - [phone] (or use [employer]) to address the above recommendations.     Thank you for trusting [facility] and we appreciate the opportunity to serve you.  We look forward to supporting your healthcare needs in the future.    Healthy Regards,    Your Health Care Team  [facility]

## 2022-09-28 RX ORDER — ESCITALOPRAM OXALATE 20 MG/1
20 TABLET ORAL DAILY
Qty: 30 TABLET | Refills: 0 | Status: SHIPPED | OUTPATIENT
Start: 2022-09-28 | End: 2022-11-02

## 2022-09-28 NOTE — TELEPHONE ENCOUNTER
Routing refill request to provider for review/approval because:  Apoorva given x1 and patient did not follow up, please advise  Patient needs to be seen because it has been more than 1 year since last office visit.  PHQ > 4    LOV 12/2019 and e-visits 5/17/2022, 1/23/2022, 11/29/21    Routing to station to assist w/ scheduling:   IN CLINIC appt.     Kaity WU RN

## 2022-11-02 ENCOUNTER — MYC REFILL (OUTPATIENT)
Dept: PEDIATRICS | Facility: CLINIC | Age: 22
End: 2022-11-02

## 2022-11-02 DIAGNOSIS — F32.2 CURRENT SEVERE EPISODE OF MAJOR DEPRESSIVE DISORDER WITHOUT PSYCHOTIC FEATURES WITHOUT PRIOR EPISODE (H): ICD-10-CM

## 2022-11-02 DIAGNOSIS — F41.1 GENERALIZED ANXIETY DISORDER: ICD-10-CM

## 2022-11-03 RX ORDER — ESCITALOPRAM OXALATE 20 MG/1
20 TABLET ORAL DAILY
Qty: 30 TABLET | Refills: 0 | Status: SHIPPED | OUTPATIENT
Start: 2022-11-03 | End: 2022-12-07

## 2022-11-03 NOTE — TELEPHONE ENCOUNTER
Routing refill request to provider for review/approval because:  Labs out of range:  PHQ9    PHQ-9 score:    PHQ 8/1/2022   PHQ-9 Total Score 11   Q9: Thoughts of better off dead/self-harm past 2 weeks Not at all   F/U: Thoughts of suicide or self-harm -   F/U: Self harm-plan -   F/U: Self-harm action -   F/U: Safety concerns -   PHQ-A Total Score -   PHQ-A Depressed most days in past year -   PHQ-A Mood affect on daily activities -   PHQ-A Suicide Ideation past 2 weeks -   PHQ-A Suicide Ideation past month -   PHQ-A Previous suicide attempt -     Paxton Barbour RN on 11/3/2022 at 1:38 PM

## 2022-11-06 ENCOUNTER — E-VISIT (OUTPATIENT)
Dept: PEDIATRICS | Facility: CLINIC | Age: 22
End: 2022-11-06
Payer: COMMERCIAL

## 2022-11-06 DIAGNOSIS — J06.9 VIRAL URI: Primary | ICD-10-CM

## 2022-11-06 PROCEDURE — 99207 PR NON-BILLABLE SERV PER CHARTING: CPT | Performed by: PEDIATRICS

## 2022-11-07 ENCOUNTER — OFFICE VISIT (OUTPATIENT)
Dept: URGENT CARE | Facility: URGENT CARE | Age: 22
End: 2022-11-07
Payer: COMMERCIAL

## 2022-11-07 VITALS
DIASTOLIC BLOOD PRESSURE: 61 MMHG | TEMPERATURE: 98.7 F | SYSTOLIC BLOOD PRESSURE: 95 MMHG | HEART RATE: 80 BPM | OXYGEN SATURATION: 98 % | RESPIRATION RATE: 11 BRPM

## 2022-11-07 DIAGNOSIS — R05.9 COUGH, UNSPECIFIED TYPE: Primary | ICD-10-CM

## 2022-11-07 LAB — DEPRECATED S PYO AG THROAT QL EIA: NEGATIVE

## 2022-11-07 PROCEDURE — 99213 OFFICE O/P EST LOW 20 MIN: CPT | Mod: CS | Performed by: FAMILY MEDICINE

## 2022-11-07 PROCEDURE — U0003 INFECTIOUS AGENT DETECTION BY NUCLEIC ACID (DNA OR RNA); SEVERE ACUTE RESPIRATORY SYNDROME CORONAVIRUS 2 (SARS-COV-2) (CORONAVIRUS DISEASE [COVID-19]), AMPLIFIED PROBE TECHNIQUE, MAKING USE OF HIGH THROUGHPUT TECHNOLOGIES AS DESCRIBED BY CMS-2020-01-R: HCPCS | Performed by: FAMILY MEDICINE

## 2022-11-07 PROCEDURE — 87651 STREP A DNA AMP PROBE: CPT | Performed by: FAMILY MEDICINE

## 2022-11-07 PROCEDURE — U0005 INFEC AGEN DETEC AMPLI PROBE: HCPCS | Performed by: FAMILY MEDICINE

## 2022-11-07 RX ORDER — BENZONATATE 100 MG/1
100 CAPSULE ORAL 3 TIMES DAILY PRN
Qty: 21 CAPSULE | Refills: 1 | Status: SHIPPED | OUTPATIENT
Start: 2022-11-07 | End: 2023-06-23

## 2022-11-07 NOTE — PATIENT INSTRUCTIONS
For cough (can take all of them together):   - Dextromethorphan 'DM' (over the counter - can be found in Robitussin/Delsym/generic cough meds)  - Honey: lozenges/cough drops or mix honey in warm water  - Tessalon/Benzonatate (prescription) every 8 hours as needed        If symptoms aren't improving in the next few days please call your doctor's office      If you develop chest pain, shortness of breath, difficulty breathing, dizziness -- seek medical attention immediately      Continue to stay hydrated as you have: ~80 ounces of water a day

## 2022-11-07 NOTE — TELEPHONE ENCOUNTER
Patient responded to My Chart with PHQ9 and VALENTE responses.  Sent to provider for review.  Joanna Navarrete, CMA

## 2022-11-07 NOTE — PROGRESS NOTES
Assessment & Plan     Cough, unspecified type  - Symptomatic; Unknown COVID-19 Virus (Coronavirus) by PCR Nose  - Streptococcus A Rapid Screen w/Reflex to PCR - Clinic Collect  - Group A Streptococcus PCR Throat Swab   Presumed viral illness. Low suspicion for influenza    At baseline blood pressure ( on lower end of most) -- absent of dizziness. No respiratory difficulties -- clear lungs and absent of fever will defer cxr imaging at this time    Will recommend tessalon/honey/DM to treat cough with expectation symptoms improve over the next few days    See AVS summary for additional recommendations reviewed with patient during this visit.       PCR COVID test collected by MA marcel Zuñiga MD   [facility]    Deon Schwarz is a 21 year old female who presents to clinic today for the following health issues:  Chief Complaint   Patient presents with     Cough     Cough, sore throat, and congestion X     HPI    Illness chart about 10 days ago initially with a sore throat which has since resolved but has a cough that is ongoing may be worsening.  She has no shortness of breath.  There is a lot of phlegm production.  She has not had any fevers during this illness.  To home COVID test returned negative.  No history of asthma or allergies.    In the past has been treated for sinus infection twice but this present time no facial pressure or significant nasal congestion.  She denies any ear pain.  History of tonsillectomy in the last year.    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  features without prior episode (H) 02/26/2019     Priority: Medium     Generalized anxiety disorder 06/28/2018     Priority: Medium     Contact dermatitis and other eczema, due to unspecified cause 10/13/2006     Priority: Medium       Current Outpatient Medications   Medication     albuterol (PROAIR HFA) 108 (90 Base) MCG/ACT inhaler     amphetamine-dextroamphetamine (ADDERALL XR) 10 MG 24 hr capsule     busPIRone (BUSPAR) 10 MG tablet     clotrimazole-betamethasone (LOTRISONE) 1-0.05 % external cream     escitalopram (LEXAPRO) 20 MG tablet     etonogestrel-ethinyl estradiol (NUVARING) 0.12-0.015 MG/24HR vaginal ring     multivitamin w/minerals (THERA-VIT-M) tablet     mupirocin (BACTROBAN) 2 % external ointment     No current facility-administered medications for this visit.         Objective    BP 95/61   Pulse 80   Temp 98.7  F (37.1  C)   Resp 11   SpO2 98%   Physical Exam     CV: RRR no m/r/g  Pulm: clear bilaterally  Throat: absent tonsils, no lesions    Results for orders placed or performed in visit on 11/07/22   Streptococcus A Rapid Screen w/Reflex to PCR - Clinic Collect     Status: Normal    Specimen: Throat; Swab   Result Value Ref Range    Group A Strep antigen Negative Negative             The use of Dragon[employer] dictation services may have been used to construct the content in this note; any grammatical or spelling errors are non-intentional. Please contact the author of this note directly if you are in need of any clarification.

## 2022-11-07 NOTE — PATIENT INSTRUCTIONS
Thank you for choosing us for your care. I think an in-clinic visit would be best next steps based on your symptoms. Please schedule a clinic appointment; you won t be charged for this eVisit.      You can schedule an appointment right here in [facility], or call [phone]

## 2022-11-07 NOTE — LETTER
November 7, 2022      Karishma Tyson  [address] [facility] [place] [address]        To Whom It May Concern:    Karishma Tyson was seen in our clinic for an illness please excuse her from missed work/school on nov 2nd and nov3rd    Sincerely,        Ayan Zuñiga MD

## 2022-11-08 LAB
GROUP A STREP BY PCR: NOT DETECTED
SARS-COV-2 RNA RESP QL NAA+PROBE: NEGATIVE

## 2022-11-20 ENCOUNTER — HEALTH MAINTENANCE LETTER (OUTPATIENT)
Age: 22
End: 2022-11-20

## 2022-12-20 ENCOUNTER — OFFICE VISIT (OUTPATIENT)
Dept: PEDIATRICS | Facility: CLINIC | Age: 22
End: 2022-12-20
Payer: COMMERCIAL

## 2022-12-20 VITALS
HEART RATE: 88 BPM | HEIGHT: 65 IN | WEIGHT: 112 LBS | OXYGEN SATURATION: 99 % | TEMPERATURE: 98.3 F | DIASTOLIC BLOOD PRESSURE: 56 MMHG | RESPIRATION RATE: 20 BRPM | BODY MASS INDEX: 18.66 KG/M2 | SYSTOLIC BLOOD PRESSURE: 100 MMHG

## 2022-12-20 DIAGNOSIS — Z11.4 SCREENING FOR HIV (HUMAN IMMUNODEFICIENCY VIRUS): ICD-10-CM

## 2022-12-20 DIAGNOSIS — F32.2 CURRENT SEVERE EPISODE OF MAJOR DEPRESSIVE DISORDER WITHOUT PSYCHOTIC FEATURES WITHOUT PRIOR EPISODE (H): Primary | ICD-10-CM

## 2022-12-20 DIAGNOSIS — F50.00 ANOREXIA NERVOSA (H): ICD-10-CM

## 2022-12-20 DIAGNOSIS — F41.1 GENERALIZED ANXIETY DISORDER: ICD-10-CM

## 2022-12-20 DIAGNOSIS — Z30.49 ENCOUNTER FOR INITIAL MANAGEMENT OF NUVARING: ICD-10-CM

## 2022-12-20 DIAGNOSIS — R11.0 NAUSEA: ICD-10-CM

## 2022-12-20 DIAGNOSIS — Z11.59 NEED FOR HEPATITIS C SCREENING TEST: ICD-10-CM

## 2022-12-20 DIAGNOSIS — F90.2 ATTENTION DEFICIT HYPERACTIVITY DISORDER (ADHD), COMBINED TYPE: ICD-10-CM

## 2022-12-20 DIAGNOSIS — Z11.3 SCREENING FOR STDS (SEXUALLY TRANSMITTED DISEASES): ICD-10-CM

## 2022-12-20 PROBLEM — J03.01 RECURRENT STREPTOCOCCAL TONSILLITIS: Status: RESOLVED | Noted: 2022-07-01 | Resolved: 2022-12-20

## 2022-12-20 PROCEDURE — 87389 HIV-1 AG W/HIV-1&-2 AB AG IA: CPT | Performed by: NURSE PRACTITIONER

## 2022-12-20 PROCEDURE — 86780 TREPONEMA PALLIDUM: CPT | Performed by: NURSE PRACTITIONER

## 2022-12-20 PROCEDURE — 86803 HEPATITIS C AB TEST: CPT | Performed by: NURSE PRACTITIONER

## 2022-12-20 PROCEDURE — 99214 OFFICE O/P EST MOD 30 MIN: CPT | Performed by: NURSE PRACTITIONER

## 2022-12-20 PROCEDURE — 36415 COLL VENOUS BLD VENIPUNCTURE: CPT | Performed by: NURSE PRACTITIONER

## 2022-12-20 PROCEDURE — 87591 N.GONORRHOEAE DNA AMP PROB: CPT | Performed by: NURSE PRACTITIONER

## 2022-12-20 PROCEDURE — 87491 CHLMYD TRACH DNA AMP PROBE: CPT | Performed by: NURSE PRACTITIONER

## 2022-12-20 RX ORDER — ONDANSETRON 4 MG/1
4 TABLET, ORALLY DISINTEGRATING ORAL EVERY 8 HOURS PRN
Qty: 30 TABLET | Refills: 1 | Status: SHIPPED | OUTPATIENT
Start: 2022-12-20 | End: 2024-03-08

## 2022-12-20 RX ORDER — ETONOGESTREL AND ETHINYL ESTRADIOL VAGINAL RING .015; .12 MG/D; MG/D
1 RING VAGINAL
Qty: 3 EACH | Refills: 3 | Status: SHIPPED | OUTPATIENT
Start: 2022-12-20 | End: 2023-06-23

## 2022-12-20 RX ORDER — BUSPIRONE HYDROCHLORIDE 10 MG/1
10 TABLET ORAL 2 TIMES DAILY
Qty: 180 TABLET | Refills: 0 | Status: SHIPPED | OUTPATIENT
Start: 2022-12-20 | End: 2023-06-23

## 2022-12-20 ASSESSMENT — ANXIETY QUESTIONNAIRES
7. FEELING AFRAID AS IF SOMETHING AWFUL MIGHT HAPPEN: SEVERAL DAYS
5. BEING SO RESTLESS THAT IT IS HARD TO SIT STILL: NEARLY EVERY DAY
GAD7 TOTAL SCORE: 16
3. WORRYING TOO MUCH ABOUT DIFFERENT THINGS: MORE THAN HALF THE DAYS
IF YOU CHECKED OFF ANY PROBLEMS ON THIS QUESTIONNAIRE, HOW DIFFICULT HAVE THESE PROBLEMS MADE IT FOR YOU TO DO YOUR WORK, TAKE CARE OF THINGS AT HOME, OR GET ALONG WITH OTHER PEOPLE: VERY DIFFICULT
1. FEELING NERVOUS, ANXIOUS, OR ON EDGE: NEARLY EVERY DAY
6. BECOMING EASILY ANNOYED OR IRRITABLE: MORE THAN HALF THE DAYS
GAD7 TOTAL SCORE: 16
2. NOT BEING ABLE TO STOP OR CONTROL WORRYING: MORE THAN HALF THE DAYS

## 2022-12-20 ASSESSMENT — PATIENT HEALTH QUESTIONNAIRE - PHQ9
SUM OF ALL RESPONSES TO PHQ QUESTIONS 1-9: 17
SUM OF ALL RESPONSES TO PHQ QUESTIONS 1-9: 17
5. POOR APPETITE OR OVEREATING: NEARLY EVERY DAY
10. IF YOU CHECKED OFF ANY PROBLEMS, HOW DIFFICULT HAVE THESE PROBLEMS MADE IT FOR YOU TO DO YOUR WORK, TAKE CARE OF THINGS AT HOME, OR GET ALONG WITH OTHER PEOPLE: VERY DIFFICULT

## 2022-12-20 ASSESSMENT — PAIN SCALES - GENERAL: PAINLEVEL: EXTREME PAIN (8)

## 2022-12-20 ASSESSMENT — ASTHMA QUESTIONNAIRES: ACT_TOTALSCORE: 24

## 2022-12-20 NOTE — PATIENT INSTRUCTIONS
Continue the lexapro and ADD the buspar TWICE daily. Use the zofran as needed. Expect a call to schedule with therapist.

## 2022-12-20 NOTE — PROGRESS NOTES
Assessment & Plan     Current severe episode of major depressive disorder without psychotic features without prior episode (H)  This is not well controlled at this point. She notes her semester at college has been stressful for her.  She does note that she is to be dropping out of school and moving back home and going to community college for the spring in order to have more support.  She does find that her parents are source of support.  She no longer seeing a therapist and she is open to seeing one again.  She does have a history of disordered eating however I asked her several different ways about this and she denies any problems or issues at this time.  She notes she is eating regularly.  She is not restricting calories.  She is weighing herself occasionally but this is not a daily occurrence.  I did discuss the potential benefit of going back to [place] for outpatient therapy however she would like to do more traditional therapy at this time.  She never did start the BuSpar when we last talked about her last visit she is open to doing this now.  We did discuss the option of stopping Lexapro and trying a different SSRI.  We discussed crisis support including [facility], [facility].  She will follow-up with me in a month.  - Adult Mental Health  Referral; Future  - busPIRone (BUSPAR) 10 MG tablet; Take 1 tablet (10 mg) by mouth 2 times daily    Generalized anxiety disorder  Per above, seems to be a bigger issue for her at this point.  She does admit that her stomach pain and nausea is related to her anxiety.  Hopefully this symptoms will improve with starting BuSpar.  - busPIRone (BUSPAR) 10 MG tablet; Take 1 tablet (10 mg) by mouth 2 times daily    Screening for STDs (sexually transmitted diseases)  Has become sexually active with male partners.  We discussed the need to use condoms every time.  She is no longer using her NuvaRing.  She notes that it had fallen out.  We discussed other options for birth  control including an IUD, Depo, Nexplanon.  She opts to restart the NuvaRing at this time.  She is bleeding right now so she could start the NuvaRing right now.  We also discussed continued use.  - NEISSERIA GONORRHOEA PCR; Future  - CHLAMYDIA TRACHOMATIS PCR; Future  - Treponema Abs w Reflex to RPR and Titer; Future  - NEISSERIA GONORRHOEA PCR  - CHLAMYDIA TRACHOMATIS PCR  - Treponema Abs w Reflex to RPR and Titer    Screening for HIV (human immunodeficiency virus)    - HIV Antigen Antibody Combo; Future  - HIV Antigen Antibody Combo    Need for hepatitis C screening test    - Hepatitis C Screen Reflex to HCV RNA Quant and Genotype; Future  - Hepatitis C Screen Reflex to HCV RNA Quant and Genotype    Attention deficit hyperactivity disorder (ADHD), combined type  She used to be on a stimulant for this but stopped it due to side effects of increased anxiety.  It was also suppressing her appetite which was an issue for her given her history of anorexia.  At this point we will hold on medicating for her ADHD however we did discussed the potential benefit of Strattera.  We will readdress at her next visit.    Nausea  Per above  - ondansetron (ZOFRAN ODT) 4 MG ODT tab; Take 1 tablet (4 mg) by mouth every 8 hours as needed for nausea    Encounter for initial management of nuvaring  Per above  - etonogestrel-ethinyl estradiol (NUVARING) 0.12-0.015 MG/24HR vaginal ring; Place 1 each vaginally every 28 days    Anorexia nervosa  Per above                 Return in about 4 weeks (around 1/17/2023) for Follow up, Video visit, Depression and/or anxiety.    [facility] [employer] DAVID Schwarz is a 22 year old, presenting for the following health issues:  No chief complaint on file.      History of Present Illness       Mental Health Follow-up:  Patient presents to follow-up on Depression & Anxiety.Patient's depression since last visit has been:  Medium  The patient is having  other symptoms associated with depression.  Patient's anxiety since last visit has been:  Worse  The patient is having other symptoms associated with anxiety.  Any significant life events: No  Patient is feeling anxious or having panic attacks.  Patient has no concerns about alcohol or drug use.    She eats 2-3 servings of fruits and vegetables daily.She consumes 1 sweetened beverage(s) daily.She exercises with enough effort to increase her heart rate 9 or less minutes per day.  She exercises with enough effort to increase her heart rate 3 or less days per week. She is missing 2 dose(s) of medications per week.    Today's PHQ-9         PHQ-9 Total Score: 17    PHQ-9 Q9 Thoughts of better off dead/self-harm past 2 weeks :   Not at all    How difficult have these problems made it for you to do your work, take care of things at home, or get along with other people: Very difficult     PHQ 11/6/2022 12/15/2022 12/20/2022   PHQ-9 Total Score 7 13 17   Q9: Thoughts of better off dead/self-harm past 2 weeks Not at all Not at all Not at all   F/U: Thoughts of suicide or self-harm - - -   F/U: Self harm-plan - - -   F/U: Self-harm action - - -   F/U: Safety concerns - - -   PHQ-A Total Score - - -   PHQ-A Depressed most days in past year - - -   PHQ-A Mood affect on daily activities - - -   PHQ-A Suicide Ideation past 2 weeks - - -   PHQ-A Suicide Ideation past month - - -   PHQ-A Previous suicide attempt - - -     VALENTE-7 SCORE 11/6/2022 12/15/2022 12/20/2022   Total Score 6 (mild anxiety) 15 (severe anxiety) -   Total Score 6 15 16     Has been on lexapro, started buspar in may. Notes she never started it. She has a history of adhd, stopped taking her adderall because it was increasing anxiety and suppressing her appetite. She wonders about going on prozac. She was on it before, not sure why she switched it. zoloft didn't work. Denies thoughts of self harm or harming others and denies suicidal ideation.  No currently seeing  "therapist, last saw her last january.     She also notes nausea x 3 years.     She reports eating about 2 times per day, augmented with snacks. She does attribute anxiety as a trigger.     Sexually active with condoms. Stopped nuvaring. She did like it.     Review of Systems   Constitutional, HEENT, cardiovascular, pulmonary, gi and gu systems are negative, except as otherwise noted.      Objective    /56   Pulse 88   Temp 98.3  F (36.8  C) (Oral)   Resp 20   Ht 1.651 m (5' 5\")   Wt 50.8 kg (112 lb)   LMP 12/20/2022   SpO2 99%   BMI 18.64 kg/m    Body mass index is 18.64 kg/m .  Physical Exam   GENERAL: healthy, alert and no distress  PSYCH: mentation appears normal, affect normal/bright                "

## 2022-12-21 LAB
C TRACH DNA SPEC QL NAA+PROBE: NEGATIVE
HCV AB SERPL QL IA: NONREACTIVE
HIV 1+2 AB+HIV1 P24 AG SERPL QL IA: NONREACTIVE
N GONORRHOEA DNA SPEC QL NAA+PROBE: NEGATIVE
T PALLIDUM AB SER QL: NONREACTIVE

## 2023-01-07 NOTE — TELEPHONE ENCOUNTER
Mother calling to check on refill.  CTC on file.  Advised refill just sent at 3:06 pm but only for 1 month.  Jyotsna Gould RN     The patient is a 16y Male complaining of chest discomfort.

## 2023-02-06 ENCOUNTER — OFFICE VISIT (OUTPATIENT)
Dept: URGENT CARE | Facility: URGENT CARE | Age: 23
End: 2023-02-06
Payer: COMMERCIAL

## 2023-02-06 VITALS
RESPIRATION RATE: 16 BRPM | WEIGHT: 110 LBS | HEIGHT: 65 IN | OXYGEN SATURATION: 98 % | BODY MASS INDEX: 18.33 KG/M2 | SYSTOLIC BLOOD PRESSURE: 90 MMHG | TEMPERATURE: 97.8 F | DIASTOLIC BLOOD PRESSURE: 62 MMHG | HEART RATE: 69 BPM

## 2023-02-06 DIAGNOSIS — J01.90 ACUTE NON-RECURRENT SINUSITIS, UNSPECIFIED LOCATION: Primary | ICD-10-CM

## 2023-02-06 PROCEDURE — 99213 OFFICE O/P EST LOW 20 MIN: CPT | Performed by: PHYSICIAN ASSISTANT

## 2023-02-06 RX ORDER — DOXYCYCLINE HYCLATE 100 MG
100 TABLET ORAL 2 TIMES DAILY
Qty: 14 TABLET | Refills: 0 | Status: SHIPPED | OUTPATIENT
Start: 2023-02-12 | End: 2023-03-24

## 2023-02-06 RX ORDER — FLUTICASONE PROPIONATE 50 MCG
1 SPRAY, SUSPENSION (ML) NASAL DAILY
Qty: 9.9 ML | Refills: 0 | Status: SHIPPED | OUTPATIENT
Start: 2023-02-06 | End: 2023-06-23

## 2023-02-06 RX ORDER — GUAIFENESIN 600 MG/1
1200 TABLET, EXTENDED RELEASE ORAL 2 TIMES DAILY
Qty: 30 TABLET | Refills: 0 | Status: SHIPPED | OUTPATIENT
Start: 2023-02-06 | End: 2023-06-23

## 2023-02-06 ASSESSMENT — ENCOUNTER SYMPTOMS
CHILLS: 0
RHINORRHEA: 1
HEADACHES: 1
SINUS PRESSURE: 1
SHORTNESS OF BREATH: 0
SORE THROAT: 1
VOMITING: 0
NAUSEA: 0
DIARRHEA: 0
SINUS PAIN: 1
ABDOMINAL PAIN: 0
FEVER: 0
COUGH: 1

## 2023-02-06 NOTE — PROGRESS NOTES
SUBJECTIVE:   Karishma Tyson is a 22 year old female presenting with a chief complaint of   Chief Complaint   Patient presents with     Urgent Care     URI     Concern of sinus infection, congestion and coughing. Sick x 8 days       She is an established patient of [facility].    URI Adult    Onset of symptoms was 7 day(s) ago.  Course of illness is worsening.    Severity moderate  Current and Associated symptoms: runny nose, stuffy nose, cough - non-productive, sore throat, facial pain/pressure and headache  Treatment measures tried include Tylenol/Ibuprofen, Decongestants and OTC Cough med.  Predisposing factors include ill contact: School.      Review of Systems   Constitutional: Negative for chills and fever.   HENT: Positive for rhinorrhea, sinus pressure, sinus pain and sore throat. Negative for ear pain.    Respiratory: Positive for cough. Negative for shortness of breath.    Gastrointestinal: Negative for abdominal pain, diarrhea, nausea and vomiting.   Neurological: Positive for headaches.   All other systems reviewed and are negative.      Past Medical History:   Diagnosis Date     Abdominal pain 2010     Constipation 2012     DERMATITIS NOS 10/13/2006      jaundice     admitted for phototherapy     OTITIS MEDIA NOS 10/13/2006     Family History   Problem Relation Age of Onset     Depression Mother      Gastrointestinal Disease Mother         IBS     Neurologic Disorder Father         seizures secondary to TBI     Gastrointestinal Disease Maternal Grandmother         gallstones     Gastrointestinal Disease Maternal Grandfather         similar abdominal pain and constipation as Karishma, no known cause     Heart Disease Maternal Grandfather         mitral valve prolapse     Arthritis Maternal Grandfather         rheumatoid arthritis     Psychotic Disorder Paternal Grandmother      Gastrointestinal Disease Paternal Grandmother         H. Pylori     Gastrointestinal Disease Paternal  "Grandfather         colitis     Heart Surgery Paternal Grandfather 76     Current Outpatient Medications   Medication Sig Dispense Refill     amphetamine-dextroamphetamine (ADDERALL XR) 10 MG 24 hr capsule        [START ON 2/12/2023] doxycycline hyclate (VIBRA-TABS) 100 MG tablet Take 1 tablet (100 mg) by mouth 2 times daily 14 tablet 0     escitalopram (LEXAPRO) 20 MG tablet Take 1 tablet (20 mg) by mouth daily 90 tablet 0     etonogestrel-ethinyl estradiol (NUVARING) 0.12-0.015 MG/24HR vaginal ring Place 1 each vaginally every 28 days 3 each 3     fluticasone (FLONASE) 50 MCG/ACT nasal spray Spray 1 spray into both nostrils daily 9.9 mL 0     guaiFENesin (MUCINEX) 600 MG 12 hr tablet Take 2 tablets (1,200 mg) by mouth 2 times daily 30 tablet 0     multivitamin w/minerals (THERA-VIT-M) tablet Take 1 tablet by mouth daily       albuterol (PROAIR HFA) 108 (90 Base) MCG/ACT inhaler Inhale 2 puffs into the lungs every 6 hours for 30 days 18 g 0     benzonatate (TESSALON) 100 MG capsule Take 1 capsule (100 mg) by mouth 3 times daily as needed for cough 21 capsule 1     busPIRone (BUSPAR) 10 MG tablet Take 1 tablet (10 mg) by mouth 2 times daily 180 tablet 0     clotrimazole-betamethasone (LOTRISONE) 1-0.05 % external cream Apply topically 2 times daily (Patient not taking: No sig reported) 45 g 0     ondansetron (ZOFRAN ODT) 4 MG ODT tab Take 1 tablet (4 mg) by mouth every 8 hours as needed for nausea 30 tablet 1     Social History     Tobacco Use     Smoking status: Never     Smokeless tobacco: Never     Tobacco comments:     non smoking home   Substance Use Topics     Alcohol use: No       OBJECTIVE  BP 90/62   Pulse 69   Temp 97.8  F (36.6  C) (Temporal)   Resp 16   Ht 1.651 m (5' 5\")   Wt 49.9 kg (110 lb)   LMP 12/20/2022   SpO2 98%   BMI 18.30 kg/m      Physical Exam  Vitals reviewed.   Constitutional:       General: She is not in acute distress.     Appearance: Normal appearance. She is normal weight. She " is not ill-appearing.   HENT:      Head: Normocephalic and atraumatic.      Right Ear: Tympanic membrane and ear canal normal.      Left Ear: Tympanic membrane and ear canal normal.      Nose: Congestion and rhinorrhea present.      Right Sinus: Maxillary sinus tenderness and frontal sinus tenderness present.      Left Sinus: Maxillary sinus tenderness and frontal sinus tenderness present.      Mouth/Throat:      Mouth: Mucous membranes are moist.      Pharynx: Uvula midline. Posterior oropharyngeal erythema present.   Eyes:      Conjunctiva/sclera: Conjunctivae normal.   Cardiovascular:      Rate and Rhythm: Normal rate and regular rhythm.      Heart sounds: Normal heart sounds.   Pulmonary:      Effort: Pulmonary effort is normal.      Breath sounds: Normal breath sounds.   Skin:     General: Skin is warm and dry.   Neurological:      Mental Status: She is alert.         Labs:  No results found for this or any previous visit (from the past 24 hour(s)).    X-Ray was not done.    ASSESSMENT:      ICD-10-CM    1. Acute non-recurrent sinusitis, unspecified location  J01.90 doxycycline hyclate (VIBRA-TABS) 100 MG tablet     fluticasone (FLONASE) 50 MCG/ACT nasal spray     guaiFENesin (MUCINEX) 600 MG 12 hr tablet           Medical Decision Making:    Differential Diagnosis:  URI Adult/Peds:  Rhinitis    Serious Comorbid Conditions:  Adult:  None    PLAN:    URI Adult:  Discussion had with patient how her symptoms are likely due to acute viral illness. Instructed to continue Tylenol, Ibuprofen as needed, drink plenty of fluids, and rest. Sent prescriptions for mucinex and flonase to help clear up her mucus and inflammation. If the patient does not improve by day 14, she can  a prescription for antibiotics. All of the patients questions were answered.    Followup:    If not improving or if condition worsens, follow up with your Primary Care Provider, If not improving or if conditions worsens over the next 12-24  hours, go to the Emergency Department    There are no Patient Instructions on file for this visit.

## 2023-03-24 ENCOUNTER — VIRTUAL VISIT (OUTPATIENT)
Dept: PEDIATRICS | Facility: CLINIC | Age: 23
End: 2023-03-24
Payer: COMMERCIAL

## 2023-03-24 DIAGNOSIS — F90.2 ATTENTION DEFICIT HYPERACTIVITY DISORDER (ADHD), COMBINED TYPE: ICD-10-CM

## 2023-03-24 DIAGNOSIS — F41.1 GENERALIZED ANXIETY DISORDER: ICD-10-CM

## 2023-03-24 DIAGNOSIS — F50.00 ANOREXIA NERVOSA (H): ICD-10-CM

## 2023-03-24 DIAGNOSIS — F32.2 CURRENT SEVERE EPISODE OF MAJOR DEPRESSIVE DISORDER WITHOUT PSYCHOTIC FEATURES WITHOUT PRIOR EPISODE (H): Primary | ICD-10-CM

## 2023-03-24 PROCEDURE — 99214 OFFICE O/P EST MOD 30 MIN: CPT | Mod: VID | Performed by: NURSE PRACTITIONER

## 2023-03-24 RX ORDER — ESCITALOPRAM OXALATE 20 MG/1
20 TABLET ORAL DAILY
Qty: 90 TABLET | Refills: 1 | Status: SHIPPED | OUTPATIENT
Start: 2023-03-24 | End: 2023-06-23

## 2023-03-24 ASSESSMENT — ANXIETY QUESTIONNAIRES
6. BECOMING EASILY ANNOYED OR IRRITABLE: NOT AT ALL
1. FEELING NERVOUS, ANXIOUS, OR ON EDGE: SEVERAL DAYS
GAD7 TOTAL SCORE: 3
2. NOT BEING ABLE TO STOP OR CONTROL WORRYING: SEVERAL DAYS
7. FEELING AFRAID AS IF SOMETHING AWFUL MIGHT HAPPEN: NOT AT ALL
GAD7 TOTAL SCORE: 3
3. WORRYING TOO MUCH ABOUT DIFFERENT THINGS: SEVERAL DAYS
5. BEING SO RESTLESS THAT IT IS HARD TO SIT STILL: NOT AT ALL

## 2023-03-24 ASSESSMENT — PATIENT HEALTH QUESTIONNAIRE - PHQ9
SUM OF ALL RESPONSES TO PHQ QUESTIONS 1-9: 2
5. POOR APPETITE OR OVEREATING: NOT AT ALL

## 2023-03-24 NOTE — PROGRESS NOTES
Karishma is a 22 year old who is being evaluated via a billable video visit.      How would you like to obtain your AVS? MyChart  If the video visit is dropped, the invitation should be resent by:   Will anyone else be joining your video visit? No        Assessment & Plan     Current severe episode of major depressive disorder without psychotic features without prior episode (H)  Stable, improved since last visit. She never started buspar and doesn't feel she needs it at this time. Willing to see therapist, will place order. Also  Needing referral for psychiatry for review for future  school.     Generalized anxiety disorder  Stable, improved.     Anorexia nervosa  Stable, no concerns, not restrictive eating, not weighing herself, she doesn't think she has lost any weight.     Attention deficit hyperactivity disorder (ADHD), combined type  Marginally controlled, taking classes and having some challenges, but she feels she has been utilizing nonpharm interventions to help. Discussed straterra, but she is concerned about future [facility] regulations. Will hold for now.          Nithya Hickey, [facility]   Karishma is a 22 year old, presenting for the following health issues:  No chief complaint on file.    Additional Questions 8/1/2022   Roomed by Keenan AVILA     History of depression and anxiety, last visit 3 month ago, discussed out of control mood, and lexapro hadn't been helping as much and discussed starting buspar, which we decided to do, but she was too anxious to do and never did. She does feel her stress levels are well controlled now. She has history of anorexia, and denies skipping meals or restrictions, doesn't weigh herself. She is not seeing a therapist. Denies thoughts of self harm or harming others and denies suicidal ideation.    She does have history of adhd, used to be on stimulant, but stopped due to side effects and suppressing appetite. She is  "school, a little challenging.     She is planning on going to school in the fall to be a  and she needs a \"psychiatry report\" to ensure her lexapro is appropriate.     Re-rx'ed nuva, but she never started it. She didn't like the way she felt on it. Currently sexually active and using condoms. Declines STI screening.     PHQ 12/15/2022 12/20/2022 3/24/2023   PHQ-9 Total Score 13 17 2   Q9: Thoughts of better off dead/self-harm past 2 weeks Not at all Not at all Not at all   F/U: Thoughts of suicide or self-harm - - -   F/U: Self harm-plan - - -   F/U: Self-harm action - - -   F/U: Safety concerns - - -   PHQ-A Total Score - - -   PHQ-A Depressed most days in past year - - -   PHQ-A Mood affect on daily activities - - -   PHQ-A Suicide Ideation past 2 weeks - - -   PHQ-A Suicide Ideation past month - - -   PHQ-A Previous suicide attempt - - -     VALENTE-7 SCORE 12/15/2022 12/20/2022 3/24/2023   Total Score 15 (severe anxiety) - -   Total Score 15 16 3       Review of Systems   Constitutional, HEENT, cardiovascular, pulmonary, GI, , musculoskeletal, neuro, skin, endocrine and psych systems are negative, except as otherwise noted.      Objective         Vitals:  No vitals were obtained today due to virtual visit.    Physical Exam   GENERAL: Healthy, alert and no distress  EYES: Eyes grossly normal to inspection.  No discharge or erythema, or obvious scleral/conjunctival abnormalities.  RESP: No audible wheeze, cough, or visible cyanosis.  No visible retractions or increased work of breathing.    SKIN: Visible skin clear. No significant rash, abnormal pigmentation or lesions.  NEURO: Cranial nerves grossly intact.  Mentation and speech appropriate for age.  PSYCH: Mentation appears normal, affect normal/bright, judgement and insight intact, normal speech and appearance well-groomed.        Video-Visit Details    Type of service:  Video Visit     Originating Location (pt. Location): Home    Distant Location (provider " location):  On-site  Platform used for Video Visit: Rodolfo

## 2023-05-11 ENCOUNTER — HOSPITAL ENCOUNTER (EMERGENCY)
Facility: CLINIC | Age: 23
Discharge: HOME | End: 2023-05-12
Attending: EMERGENCY MEDICINE
Payer: COMMERCIAL

## 2023-05-11 ENCOUNTER — APPOINTMENT (OUTPATIENT)
Dept: CT IMAGING | Facility: CLINIC | Age: 23
End: 2023-05-11
Attending: EMERGENCY MEDICINE
Payer: COMMERCIAL

## 2023-05-11 ENCOUNTER — APPOINTMENT (OUTPATIENT)
Dept: ULTRASOUND IMAGING | Facility: CLINIC | Age: 23
End: 2023-05-11
Attending: EMERGENCY MEDICINE
Payer: COMMERCIAL

## 2023-05-11 DIAGNOSIS — R10.31 RLQ ABDOMINAL PAIN: ICD-10-CM

## 2023-05-11 DIAGNOSIS — N83.201 OVARIAN CYST, RIGHT: ICD-10-CM

## 2023-05-11 LAB
ALBUMIN UR-MCNC: NEGATIVE MG/DL
ANION GAP SERPL CALCULATED.3IONS-SCNC: 9 MMOL/L (ref 7–15)
APPEARANCE UR: CLEAR
BASOPHILS # BLD AUTO: 0 10E3/UL (ref 0–0.2)
BASOPHILS NFR BLD AUTO: 1 %
BILIRUB UR QL STRIP: NEGATIVE
BUN SERPL-MCNC: 15.4 MG/DL (ref 6–20)
CALCIUM SERPL-MCNC: 9.2 MG/DL (ref 8.6–10)
CHLORIDE SERPL-SCNC: 103 MMOL/L (ref 98–107)
COLOR UR AUTO: NORMAL
CREAT SERPL-MCNC: 0.55 MG/DL (ref 0.51–0.95)
DEPRECATED HCO3 PLAS-SCNC: 24 MMOL/L (ref 22–29)
EOSINOPHIL # BLD AUTO: 0.2 10E3/UL (ref 0–0.7)
EOSINOPHIL NFR BLD AUTO: 3 %
ERYTHROCYTE [DISTWIDTH] IN BLOOD BY AUTOMATED COUNT: 11.7 % (ref 10–15)
GFR SERPL CREATININE-BSD FRML MDRD: >90 ML/MIN/1.73M2
GLUCOSE SERPL-MCNC: 95 MG/DL (ref 70–99)
GLUCOSE UR STRIP-MCNC: NEGATIVE MG/DL
HCG UR QL: NEGATIVE
HCT VFR BLD AUTO: 37.7 % (ref 35–47)
HGB BLD-MCNC: 12.8 G/DL (ref 11.7–15.7)
HGB UR QL STRIP: NEGATIVE
IMM GRANULOCYTES # BLD: 0 10E3/UL
IMM GRANULOCYTES NFR BLD: 0 %
KETONES UR STRIP-MCNC: NEGATIVE MG/DL
LEUKOCYTE ESTERASE UR QL STRIP: NEGATIVE
LIPASE SERPL-CCNC: 30 U/L (ref 13–60)
LYMPHOCYTES # BLD AUTO: 2.8 10E3/UL (ref 0.8–5.3)
LYMPHOCYTES NFR BLD AUTO: 34 %
MCH RBC QN AUTO: 30.9 PG (ref 26.5–33)
MCHC RBC AUTO-ENTMCNC: 34 G/DL (ref 31.5–36.5)
MCV RBC AUTO: 91 FL (ref 78–100)
MONOCYTES # BLD AUTO: 0.7 10E3/UL (ref 0–1.3)
MONOCYTES NFR BLD AUTO: 8 %
NEUTROPHILS # BLD AUTO: 4.4 10E3/UL (ref 1.6–8.3)
NEUTROPHILS NFR BLD AUTO: 54 %
NITRATE UR QL: NEGATIVE
NRBC # BLD AUTO: 0 10E3/UL
NRBC BLD AUTO-RTO: 0 /100
PH UR STRIP: 7 [PH] (ref 5–7)
PLATELET # BLD AUTO: 218 10E3/UL (ref 150–450)
POTASSIUM SERPL-SCNC: 4 MMOL/L (ref 3.4–5.3)
RBC # BLD AUTO: 4.14 10E6/UL (ref 3.8–5.2)
SODIUM SERPL-SCNC: 136 MMOL/L (ref 136–145)
SP GR UR STRIP: 1.01 (ref 1–1.03)
UROBILINOGEN UR STRIP-MCNC: NORMAL MG/DL
WBC # BLD AUTO: 8.2 10E3/UL (ref 4–11)

## 2023-05-11 PROCEDURE — 81003 URINALYSIS AUTO W/O SCOPE: CPT | Performed by: EMERGENCY MEDICINE

## 2023-05-11 PROCEDURE — 74177 CT ABD & PELVIS W/CONTRAST: CPT

## 2023-05-11 PROCEDURE — 36415 COLL VENOUS BLD VENIPUNCTURE: CPT | Performed by: EMERGENCY MEDICINE

## 2023-05-11 PROCEDURE — 85025 COMPLETE CBC W/AUTO DIFF WBC: CPT | Performed by: EMERGENCY MEDICINE

## 2023-05-11 PROCEDURE — 83690 ASSAY OF LIPASE: CPT | Performed by: EMERGENCY MEDICINE

## 2023-05-11 PROCEDURE — 76705 ECHO EXAM OF ABDOMEN: CPT

## 2023-05-11 PROCEDURE — 80048 BASIC METABOLIC PNL TOTAL CA: CPT | Performed by: EMERGENCY MEDICINE

## 2023-05-11 PROCEDURE — 81025 URINE PREGNANCY TEST: CPT | Performed by: EMERGENCY MEDICINE

## 2023-05-11 PROCEDURE — 99285 EMERGENCY DEPT VISIT HI MDM: CPT | Mod: 25

## 2023-05-11 ASSESSMENT — ENCOUNTER SYMPTOMS: ABDOMINAL PAIN: 1

## 2023-05-12 VITALS
SYSTOLIC BLOOD PRESSURE: 108 MMHG | TEMPERATURE: 97.3 F | RESPIRATION RATE: 18 BRPM | WEIGHT: 115 LBS | OXYGEN SATURATION: 100 % | HEART RATE: 71 BPM | DIASTOLIC BLOOD PRESSURE: 63 MMHG | BODY MASS INDEX: 19.14 KG/M2

## 2023-05-12 PROCEDURE — 250N000011 HC RX IP 250 OP 636: Performed by: EMERGENCY MEDICINE

## 2023-05-12 RX ORDER — IOPAMIDOL 755 MG/ML
500 INJECTION, SOLUTION INTRAVASCULAR ONCE
Status: COMPLETED | OUTPATIENT
Start: 2023-05-12 | End: 2023-05-12

## 2023-05-12 RX ADMIN — IOPAMIDOL 58 ML: 755 INJECTION, SOLUTION INTRAVENOUS at 00:05

## 2023-05-12 NOTE — ED PROVIDER NOTES
History     Chief Complaint:  Abdominal Pain       The history is provided by the patient.      Karishma Tyson is a 22 year old female with a history of anorexia who presents with abdominal pain. Yesterday around 1700, the patient began experiencing lower abdominal cramping and discomfort. She states it felt similar to her menstrual cramps but she is not on her period. Her pain came and went but woke up with her discomfort this morning. She took Advil this morning which alleviated her symptoms throughout the day. At [id number] tonight, she started having the same pain but it became worse and was persistent. While in the ED, she states it is localized to her lower abdomen and radiates to the right.         Review of External Notes: Reviewed 2023 the IM/peds visit    ROS:  Review of Systems   Gastrointestinal: Positive for abdominal pain.   All other systems reviewed and are negative.      Allergies:  Gluten     Medications:    Albuterol inhaler  Adderall  Tessalon  Buspar  Lexapro  Nuvaring  Flonase  Mucinex  Zofran     Past Medical History:    Dermatitis   jaundice  Otitis media   Anorexia nervosa   VALENTE  ADHD  Dysmenorrhea   Depressive disorder    Past Surgical History:    Rectal biopsy   Tonsillectomy  [place] teeth extraction   GI endoscopy      Family History:    family history includes Arthritis in her maternal grandfather; Depression in her mother; Gastrointestinal Disease in her maternal grandfather, maternal grandmother, mother, paternal grandfather, and paternal grandmother; Heart Disease in her maternal grandfather; Heart Surgery (age of onset: 76) in her paternal grandfather; Neurologic Disorder in her father; Psychotic Disorder in her paternal grandmother.    Social History:   reports that she has never smoked. She has never used smokeless tobacco. She reports that she does not drink alcohol and does not use drugs.  PCP: Nithya Mohamud     Physical Exam     Patient Vitals  for the past 24 hrs:   BP Temp Temp src Pulse Resp SpO2 Weight   05/12/23 0051 -- -- -- 71 18 100 % --   05/11/23 2047 108/63 97.3  F (36.3  C) Temporal 77 14 100 % 52.2 kg (115 lb)        Physical Exam  Constitutional: Alert, attentive  HENT:    Nose: Nose normal.    Mouth/Throat: Oropharynx is clear, mucous membranes are moist   Eyes: EOM are normal.   CV: regular rate and rhythm; no murmurs, rubs or gallups  Chest: Effort normal and breath sounds normal.   GI:  There is +RLQ tenderness. No distension. Normal bowel sounds  MSK: Normal range of motion.   Neurological: Alert, attentive  Skin: Skin is warm and dry.      Emergency Department Course     Imaging:  CT Abdomen Pelvis w Contrast   Final Result   IMPRESSION:    1.  There is a large amount of stool throughout the colon. No bowel obstruction or inflammation. The appendix is within normal limits.   2.  There is a 2.3 cm collapsing right ovarian follicle. Small amount of free pelvic fluid.   3.  Left pelvic kidney.      US Abdomen Limited   Final Result   IMPRESSION:   1.  No gallstone or evidence of cholecystitis.   2.  Mildly dilated common bile duct without cause of obstruction seen.               Report per radiology    Laboratory:  Labs Ordered and Resulted from Time of ED Arrival to Time of ED Departure   URINE MACROSCOPIC WITH REFLEX TO MICRO - Normal       Result Value    Color Urine Straw      Appearance Urine Clear      Glucose Urine Negative      Bilirubin Urine Negative      Ketones Urine Negative      Specific Gravity Urine 1.009      Blood Urine Negative      pH Urine 7.0      Protein Albumin Urine Negative      Urobilinogen Urine Normal      Nitrite Urine Negative      Leukocyte Esterase Urine Negative     BASIC METABOLIC PANEL - Normal    Sodium 136      Potassium 4.0      Chloride 103      Carbon Dioxide (CO2) 24      Anion Gap 9      Urea Nitrogen 15.4      Creatinine 0.55      Calcium 9.2      Glucose 95      GFR Estimate >90     LIPASE -  Normal    Lipase 30     HCG QUALITATIVE URINE - Normal    hCG Urine Qualitative Negative     CBC WITH PLATELETS AND DIFFERENTIAL    WBC Count 8.2      RBC Count 4.14      Hemoglobin 12.8      Hematocrit 37.7      MCV 91      MCH 30.9      MCHC 34.0      RDW 11.7      Platelet Count 218      % Neutrophils 54      % Lymphocytes 34      % Monocytes 8      % Eosinophils 3      % Basophils 1      % Immature Granulocytes 0      NRBCs per 100 WBC 0      Absolute Neutrophils 4.4      Absolute Lymphocytes 2.8      Absolute Monocytes 0.7      Absolute Eosinophils 0.2      Absolute Basophils 0.0      Absolute Immature Granulocytes 0.0      Absolute NRBCs 0.0              Emergency Department Course & Assessments:             Interventions:  Medications   iopamidol (ISOVUE-370) solution 500 mL (58 mLs Intravenous $Given 5/12/23 0005)   sodium chloride (PF) 0.9% PF flush 100 mL (54 mLs Intravenous $Given 5/12/23 0005)        Assessments:  2320 I evaluated the patient and gained an initial history.     Independent Interpretation (X-rays, CTs, rhythm strip):  CT shows no pneumoperitoneum.     Consultations/Discussion of Management or Tests:  0039 I spoke with the patient and discussed her CT results and plan for discharge.            Disposition:  The patient was discharged to home.     Impression & Plan    CMS Diagnoses: None      Medical Decision Making:  This a pleasant 22-year-old female presents for evaluation of right-sided abdominal pain as described above.  Differential includes possible ovarian cyst, ureterolithiasis, appendicitis, cholelithiasis, among others.  Labs showed no evidence of hepatitis, pancreatitis, or other acute abnormality.  Right upper quadrant ultrasound is negative for cholelithiasis.  On recheck, the patient is now describing more right lower quadrant abdominal pain.  CT shows evidence of a right-sided ovarian cyst with collapse.  Based on absence of significant or intractable pain, as well as size  less than 4 cm, strongly doubt ovarian torsion.  Discussed natural history of ovarian cyst.  Plan OB/GYN follow-up in 3 to 5 days return precautions for worse pain, fever, vomiting, or any other concerns.        Diagnosis:    ICD-10-CM    1. Ovarian cyst, right  N83.201       2. RLQ abdominal pain  R10.31              Scribe Disclosure:  I, Yue Patino, am serving as a scribe at 11:16 PM on 5/11/2023 to document services personally performed by Jayesh Chacon MD based on my observations and the provider's statements to me.   5/11/2023   Jayesh Chacon MD [place], John Eric, MD  05/12/23 0259

## 2023-05-12 NOTE — ED TRIAGE NOTES
22F presents to ED c/o RUQ and Upper ABD pain x 24hrs.  Pt states shes had intermittent nausea during the same period. Denied emesis and loose stools.  Pt denied CP and SOB.  VSS, A&Ox4     Triage Assessment     Row Name 05/11/23 2049       Triage Assessment (Adult)    Airway WDL WDL       Respiratory WDL    Respiratory WDL WDL       Skin Circulation/Temperature WDL    Skin Circulation/Temperature WDL WDL       Cardiac WDL    Cardiac WDL WDL       Peripheral/Neurovascular WDL    Peripheral Neurovascular WDL WDL       Cognitive/Neuro/Behavioral WDL    Cognitive/Neuro/Behavioral WDL WDL

## 2023-05-26 ENCOUNTER — OFFICE VISIT (OUTPATIENT)
Dept: URGENT CARE | Facility: URGENT CARE | Age: 23
End: 2023-05-26
Payer: COMMERCIAL

## 2023-05-26 VITALS
DIASTOLIC BLOOD PRESSURE: 61 MMHG | WEIGHT: 112.6 LBS | HEART RATE: 95 BPM | SYSTOLIC BLOOD PRESSURE: 98 MMHG | TEMPERATURE: 98.7 F | BODY MASS INDEX: 18.74 KG/M2 | OXYGEN SATURATION: 99 % | RESPIRATION RATE: 22 BRPM

## 2023-05-26 DIAGNOSIS — N76.0 BV (BACTERIAL VAGINOSIS): ICD-10-CM

## 2023-05-26 DIAGNOSIS — R30.0 DYSURIA: Primary | ICD-10-CM

## 2023-05-26 DIAGNOSIS — N39.0 URINARY TRACT INFECTION WITHOUT HEMATURIA, SITE UNSPECIFIED: ICD-10-CM

## 2023-05-26 DIAGNOSIS — B96.89 BV (BACTERIAL VAGINOSIS): ICD-10-CM

## 2023-05-26 LAB
ALBUMIN UR-MCNC: NEGATIVE MG/DL
APPEARANCE UR: CLEAR
BACTERIA #/AREA URNS HPF: ABNORMAL /HPF
BILIRUB UR QL STRIP: NEGATIVE
CLUE CELLS: PRESENT
COLOR UR AUTO: YELLOW
GLUCOSE UR STRIP-MCNC: NEGATIVE MG/DL
HGB UR QL STRIP: ABNORMAL
KETONES UR STRIP-MCNC: NEGATIVE MG/DL
LEUKOCYTE ESTERASE UR QL STRIP: ABNORMAL
NITRATE UR QL: NEGATIVE
PH UR STRIP: 7 [PH] (ref 5–7)
RBC #/AREA URNS AUTO: ABNORMAL /HPF
SP GR UR STRIP: <=1.005 (ref 1–1.03)
SQUAMOUS #/AREA URNS AUTO: ABNORMAL /LPF
TRICHOMONAS, WET PREP: ABNORMAL
UROBILINOGEN UR STRIP-ACNC: 0.2 E.U./DL
WBC #/AREA URNS AUTO: ABNORMAL /HPF
WBC'S/HIGH POWER FIELD, WET PREP: ABNORMAL
YEAST, WET PREP: ABNORMAL

## 2023-05-26 PROCEDURE — 81001 URINALYSIS AUTO W/SCOPE: CPT | Performed by: FAMILY MEDICINE

## 2023-05-26 PROCEDURE — 99213 OFFICE O/P EST LOW 20 MIN: CPT | Performed by: FAMILY MEDICINE

## 2023-05-26 PROCEDURE — 87591 N.GONORRHOEAE DNA AMP PROB: CPT | Performed by: FAMILY MEDICINE

## 2023-05-26 PROCEDURE — 87491 CHLMYD TRACH DNA AMP PROBE: CPT | Performed by: FAMILY MEDICINE

## 2023-05-26 PROCEDURE — 87210 SMEAR WET MOUNT SALINE/INK: CPT | Performed by: FAMILY MEDICINE

## 2023-05-26 RX ORDER — NITROFURANTOIN 25; 75 MG/1; MG/1
100 CAPSULE ORAL 2 TIMES DAILY
Qty: 10 CAPSULE | Refills: 0 | Status: SHIPPED | OUTPATIENT
Start: 2023-05-26 | End: 2023-05-31

## 2023-05-26 RX ORDER — METRONIDAZOLE 500 MG/1
500 TABLET ORAL 2 TIMES DAILY
Qty: 14 TABLET | Refills: 0 | Status: SHIPPED | OUTPATIENT
Start: 2023-05-26 | End: 2023-06-02

## 2023-05-26 NOTE — PROGRESS NOTES
SUBJECTIVE: Karishma Tyson is a 22 year old female who  presents today for a possible UTI.   Symptoms of dysuria and frequency have been going on for 1day(s).    Past Medical History:   Diagnosis Date     Abdominal pain 2010     Constipation 2012     DERMATITIS NOS 10/13/2006      jaundice     admitted for phototherapy     OTITIS MEDIA NOS 10/13/2006     Allergies   Allergen Reactions     Gluten GI Disturbance     significance     Social History     Tobacco Use     Smoking status: Never     Smokeless tobacco: Never     Tobacco comments:     non smoking home   Vaping Use     Vaping status: Not on file   Substance Use Topics     Alcohol use: No       ROS: CONSTITUTIONAL:NEGATIVE for fever, chills, change in weight    OBJECTIVE:  BP 98/61 (BP Location: Left arm, Patient Position: Sitting, Cuff Size: Adult Regular)   Pulse 95   Temp 98.7  F (37.1  C) (Tympanic)   Resp 22   Wt 51.1 kg (112 lb 9.6 oz)   SpO2 99%   BMI 18.74 kg/m      No Flank/abd pain      ICD-10-CM    1. Dysuria  R30.0 UA with Microscopic reflex to Culture - Clinic Collect     Wet prep - Clinic Collect     Chlamydia trachomatis PCR     Neisseria gonorrhoeae PCR     UA Microscopic with Reflex to Culture      2. BV (bacterial vaginosis)  N76.0 metroNIDAZOLE (FLAGYL) 500 MG tablet    B96.89       3. Urinary tract infection without hematuria, site unspecified  N39.0 nitroFURantoin macrocrystal-monohydrate (MACROBID) 100 MG capsule          Drink plenty of fluids.  Prevention and treatment of UTI's discussed.Signs and symptoms of pyelonephritis mentioned.  Follow up with primary care physician if not improving

## 2023-05-27 LAB
C TRACH DNA SPEC QL NAA+PROBE: NEGATIVE
N GONORRHOEA DNA SPEC QL NAA+PROBE: NEGATIVE

## 2023-06-02 ENCOUNTER — HEALTH MAINTENANCE LETTER (OUTPATIENT)
Age: 23
End: 2023-06-02

## 2023-06-23 ENCOUNTER — VIRTUAL VISIT (OUTPATIENT)
Dept: PEDIATRICS | Facility: CLINIC | Age: 23
End: 2023-06-23
Payer: COMMERCIAL

## 2023-06-23 DIAGNOSIS — F33.9 RECURRENT MAJOR DEPRESSIVE DISORDER, REMISSION STATUS UNSPECIFIED (H): Primary | ICD-10-CM

## 2023-06-23 DIAGNOSIS — F90.2 ATTENTION DEFICIT HYPERACTIVITY DISORDER (ADHD), COMBINED TYPE: ICD-10-CM

## 2023-06-23 DIAGNOSIS — F41.1 GENERALIZED ANXIETY DISORDER: ICD-10-CM

## 2023-06-23 PROCEDURE — 99214 OFFICE O/P EST MOD 30 MIN: CPT | Mod: 93 | Performed by: NURSE PRACTITIONER

## 2023-06-23 RX ORDER — ESCITALOPRAM OXALATE 20 MG/1
20 TABLET ORAL DAILY
Qty: 90 TABLET | Refills: 1 | Status: SHIPPED | OUTPATIENT
Start: 2023-06-23 | End: 2024-03-08

## 2023-06-23 NOTE — PROGRESS NOTES
Karishma is a 22 year old who is being evaluated via a billable telephone visit.      What phone number would you like to be contacted at?   How would you like to obtain your AVS? [place]    Distant Location (provider location):  On-site    Assessment & Plan     Recurrent major depressive disorder, remission status unspecified (H)  Stable on lexapro, likes it and feels it's working well  - escitalopram (LEXAPRO) 20 MG tablet; Take 1 tablet (20 mg) by mouth daily    Generalized anxiety disorder  Stable, no worsening symptoms.   - escitalopram (LEXAPRO) 20 MG tablet; Take 1 tablet (20 mg) by mouth daily    Attention deficit hyperactivity disorder (ADHD), combined type  Wants to be off the meds for this for now.            Nithya Hickey, TAMMY [facility] [place]   Karishma is a 22 year old, presenting for the following health issues:  No chief complaint on file.        8/1/2022     9:52 AM   Additional Questions   Roomed by Keenan X     HPI     At last visit 3 month ago, notes she feels good. Has been taking lexapro without problems. 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 Regarding ADHD symptoms, she feels stable off meds.         12/15/2022     2:02 PM 12/20/2022    11:08 AM 3/24/2023    10:05 AM   PHQ   PHQ-9 Total Score 13 17 2   Q9: Thoughts of better off dead/self-harm past 2 weeks Not at all Not at all Not at all         12/15/2022     2:02 PM 12/20/2022    11:22 AM 3/24/2023    10:05 AM   VALENTE-7 SCORE   Total Score 15 (severe anxiety)     Total Score 15 16 3     Stopped nuva, stopped because she felt she doesn't need it.     Review of Systems   Constitutional, HEENT, cardiovascular, pulmonary, gi and gu systems are negative, except as otherwise noted.      Objective           Vitals:  No vitals were obtained today due to virtual visit.    Physical  Exam   healthy, alert and no distress  PSYCH: Alert and oriented times 3; coherent speech, normal   rate and volume, able to articulate logical thoughts, able   to abstract reason, no tangential thoughts, no hallucinations   or delusions  Her affect is normal  RESP: No cough, no audible wheezing, able to talk in full sentences  Remainder of exam unable to be completed due to telephone visits            Phone call duration: 10 minutes

## 2023-07-19 ENCOUNTER — MYC MEDICAL ADVICE (OUTPATIENT)
Dept: PEDIATRICS | Facility: CLINIC | Age: 23
End: 2023-07-19
Payer: COMMERCIAL

## 2023-07-19 DIAGNOSIS — R05.8 PRODUCTIVE COUGH: ICD-10-CM

## 2023-07-19 DIAGNOSIS — R06.02 SOB (SHORTNESS OF BREATH): ICD-10-CM

## 2023-07-20 RX ORDER — ALBUTEROL SULFATE 90 UG/1
2 AEROSOL, METERED RESPIRATORY (INHALATION) EVERY 6 HOURS
Qty: 18 G | Refills: 0 | Status: SHIPPED | OUTPATIENT
Start: 2023-07-20

## 2023-07-20 NOTE — TELEPHONE ENCOUNTER
Called and spoke with patient. Patient denies any dizziness or lightheadedness, denies difficulty breathing. Patient just wants refill for inhaler. This was last prescribe by [facility] but has been prescribed by PCP in the past. Please review and advise.     Bry NGUYEN RN 7/20/2023 at 11:42 AM

## 2023-10-03 ENCOUNTER — E-VISIT (OUTPATIENT)
Dept: PEDIATRICS | Facility: CLINIC | Age: 23
End: 2023-10-03
Payer: COMMERCIAL

## 2023-10-03 DIAGNOSIS — F41.1 GENERALIZED ANXIETY DISORDER: ICD-10-CM

## 2023-10-03 DIAGNOSIS — F33.9 RECURRENT MAJOR DEPRESSIVE DISORDER, REMISSION STATUS UNSPECIFIED (H): ICD-10-CM

## 2023-10-03 PROCEDURE — 99421 OL DIG E/M SVC 5-10 MIN: CPT | Mod: FQ | Performed by: NURSE PRACTITIONER

## 2023-10-04 DIAGNOSIS — F33.9 RECURRENT MAJOR DEPRESSIVE DISORDER, REMISSION STATUS UNSPECIFIED (H): ICD-10-CM

## 2023-10-04 DIAGNOSIS — F41.1 GENERALIZED ANXIETY DISORDER: ICD-10-CM

## 2023-10-09 RX ORDER — ESCITALOPRAM OXALATE 20 MG/1
20 TABLET ORAL DAILY
Qty: 90 TABLET | Refills: 1 | OUTPATIENT
Start: 2023-10-09

## 2024-03-08 ENCOUNTER — TELEPHONE (OUTPATIENT)
Dept: PEDIATRICS | Facility: CLINIC | Age: 24
End: 2024-03-08

## 2024-03-08 ENCOUNTER — VIRTUAL VISIT (OUTPATIENT)
Dept: PEDIATRICS | Facility: CLINIC | Age: 24
End: 2024-03-08
Payer: COMMERCIAL

## 2024-03-08 DIAGNOSIS — F90.2 ATTENTION DEFICIT HYPERACTIVITY DISORDER (ADHD), COMBINED TYPE: ICD-10-CM

## 2024-03-08 DIAGNOSIS — Z30.44 ENCOUNTER FOR SURVEILLANCE OF VAGINAL RING HORMONAL CONTRACEPTIVE DEVICE: ICD-10-CM

## 2024-03-08 DIAGNOSIS — F41.1 GENERALIZED ANXIETY DISORDER: Primary | ICD-10-CM

## 2024-03-08 DIAGNOSIS — F33.1 MODERATE EPISODE OF RECURRENT MAJOR DEPRESSIVE DISORDER (H): ICD-10-CM

## 2024-03-08 DIAGNOSIS — R11.0 NAUSEA: ICD-10-CM

## 2024-03-08 PROBLEM — F50.00 ANOREXIA NERVOSA (H): Status: RESOLVED | Noted: 2019-04-21 | Resolved: 2024-03-08

## 2024-03-08 PROCEDURE — 99214 OFFICE O/P EST MOD 30 MIN: CPT | Mod: 95 | Performed by: NURSE PRACTITIONER

## 2024-03-08 RX ORDER — ATENOLOL 25 MG/1
25 TABLET ORAL DAILY PRN
Qty: 30 TABLET | Refills: 0 | Status: SHIPPED | OUTPATIENT
Start: 2024-03-08 | End: 2024-04-26

## 2024-03-08 RX ORDER — BUSPIRONE HYDROCHLORIDE 10 MG/1
10 TABLET ORAL 2 TIMES DAILY
Qty: 180 TABLET | Refills: 0 | Status: SHIPPED | OUTPATIENT
Start: 2024-03-08

## 2024-03-08 RX ORDER — ETONOGESTREL AND ETHINYL ESTRADIOL VAGINAL RING .015; .12 MG/D; MG/D
RING VAGINAL
Qty: 4 EACH | Refills: 11 | Status: SHIPPED | OUTPATIENT
Start: 2024-03-08

## 2024-03-08 RX ORDER — ONDANSETRON 4 MG/1
4 TABLET, ORALLY DISINTEGRATING ORAL EVERY 8 HOURS PRN
Qty: 30 TABLET | Refills: 1 | Status: SHIPPED | OUTPATIENT
Start: 2024-03-08 | End: 2024-04-26

## 2024-03-08 RX ORDER — ESCITALOPRAM OXALATE 20 MG/1
20 TABLET ORAL DAILY
Qty: 90 TABLET | Refills: 1 | Status: SHIPPED | OUTPATIENT
Start: 2024-03-08 | End: 2024-09-30

## 2024-03-08 NOTE — PROGRESS NOTES
Karishma is a 23 year old who is being evaluated via a billable video visit.      How would you like to obtain your AVS? MyChart  If the video visit is dropped, the invitation should be resent by:   Will anyone else be joining your video visit? No          Assessment & Plan     Generalized anxiety disorder  Not well controlled, continue sergey and add bupsar. Ok to use zofran for nausea and bb for acute anxiety. Follow-up I 1 month. Will find therapist on campus  - escitalopram (LEXAPRO) 20 MG tablet; Take 1 tablet (20 mg) by mouth daily  - busPIRone (BUSPAR) 10 MG tablet; Take 1 tablet (10 mg) by mouth 2 times daily  - atenolol (TENORMIN) 25 MG tablet; Take 1 tablet (25 mg) by mouth daily as needed (anxiety)    Attention deficit hyperactivity disorder (ADHD), combined type  Ok to hold off on stimulants    Moderate episode of recurrent major depressive disorder (H)  Per above  - escitalopram (LEXAPRO) 20 MG tablet; Take 1 tablet (20 mg) by mouth daily    Nausea    - ondansetron (ZOFRAN ODT) 4 MG ODT tab; Take 1 tablet (4 mg) by mouth every 8 hours as needed for nausea    Encounter for surveillance of vaginal ring hormonal contraceptive device  STI screening encouranged.   - etonogestrel-ethinyl estradiol (NUVARING) 0.12-0.015 MG/24HR vaginal ring; Insert one (1) ring vaginally and leave in place for 3 consecutive weeks (21 days), then remove for 1 week.                  Subjective   Karishma is a 23 year old, presenting for the following health issues:  No chief complaint on file.    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 History of anxiety and dep, notes there were two deaths in the family which has been hard. She is on lexapro, she has had increase in acute anxiety (increased heart rate) and nausea. Denies triggered restricted eating or wt loss. She was prescribed buspar in the past but never started it because of fear and anxiety.         12/15/2022      2:02 PM 12/20/2022    11:08 AM 3/24/2023    10:05 AM   PHQ   PHQ-9 Total Score 13 17 2   Q9: Thoughts of better off dead/self-harm past 2 weeks Not at all Not at all Not at all         12/15/2022     2:02 PM 12/20/2022    11:22 AM 3/24/2023    10:05 AM   VALENTE-7 SCORE   Total Score 15 (severe anxiety)     Total Score 15 16 3     She has adhd, used to be on stimulant, was nervous about increased credit this semester, but feels it's going ok, ok to hold on stimulant.     Has BF< used to be on the nuvaring.             Objective           Vitals:  No vitals were obtained today due to virtual visit.    Physical Exam   GENERAL: alert and no distress  EYES: Eyes grossly normal to inspection.  No discharge or erythema, or obvious scleral/conjunctival abnormalities.  RESP: No audible wheeze, cough, or visible cyanosis.    SKIN: Visible skin clear. No significant rash, abnormal pigmentation or lesions.  NEURO: Cranial nerves grossly intact.  Mentation and speech appropriate for age.  PSYCH: Appropriate affect, tone, and pace of words        Video-Visit Details    Type of service:  Video Visit     Originating Location (pt. Location): Home    Distant Location (provider location):  On-site  Platform used for Video Visit: Rodolfo  Signed Electronically by: TAMMY Mcgraw CNP

## 2024-03-08 NOTE — PATIENT INSTRUCTIONS
Check with your student services about a therapist as soon as feasible.     Let's keep the lexapro dose the same for now. Restart the buspar. Use the zofran as needed for nausea. Try the atenolol for acute panic.

## 2024-04-26 ENCOUNTER — TELEPHONE (OUTPATIENT)
Dept: PEDIATRICS | Facility: CLINIC | Age: 24
End: 2024-04-26

## 2024-04-26 ENCOUNTER — VIRTUAL VISIT (OUTPATIENT)
Dept: PEDIATRICS | Facility: CLINIC | Age: 24
End: 2024-04-26
Payer: COMMERCIAL

## 2024-04-26 DIAGNOSIS — R11.0 NAUSEA: ICD-10-CM

## 2024-04-26 DIAGNOSIS — F33.41 RECURRENT MAJOR DEPRESSIVE DISORDER, IN PARTIAL REMISSION (H): ICD-10-CM

## 2024-04-26 DIAGNOSIS — F41.1 GENERALIZED ANXIETY DISORDER: Primary | ICD-10-CM

## 2024-04-26 DIAGNOSIS — F90.2 ATTENTION DEFICIT HYPERACTIVITY DISORDER (ADHD), COMBINED TYPE: ICD-10-CM

## 2024-04-26 PROCEDURE — 99214 OFFICE O/P EST MOD 30 MIN: CPT | Mod: 93 | Performed by: NURSE PRACTITIONER

## 2024-04-26 RX ORDER — ONDANSETRON 4 MG/1
4 TABLET, ORALLY DISINTEGRATING ORAL EVERY 8 HOURS PRN
Qty: 30 TABLET | Refills: 1 | Status: SHIPPED | OUTPATIENT
Start: 2024-04-26

## 2024-04-26 RX ORDER — ATENOLOL 25 MG/1
25 TABLET ORAL DAILY PRN
Qty: 30 TABLET | Refills: 0 | Status: SHIPPED | OUTPATIENT
Start: 2024-04-26

## 2024-04-26 NOTE — TELEPHONE ENCOUNTER
Patient calling.  States she is to have a E-Visit and can not find a link.  She is to have a telephone visit at 11 am.  Advised provider will call her.  Patient agrees with plan.  Jyotsna Gould RN

## 2024-04-26 NOTE — PATIENT INSTRUCTIONS
Give the buspar a try. You can take it once daily until it's more of a habit, then take it twice daily. Continue the lexapro. You can use the atenolol and zofran.     Let's talk about adhd meds when I see you in may.

## 2024-04-26 NOTE — PROGRESS NOTES
Karishma is a 23 year old who is being evaluated via a billable telephone visit.      Originating Location (pt. Location): Home    Distant Location (provider location):  On-site    Assessment & Plan     1. Generalized anxiety disorder    2. Recurrent major depressive disorder, in partial remission (H24)    3. Nausea    4. Attention deficit hyperactivity disorder (ADHD), combined type      PLAN:  Discussed importance of getting better management of anxiety  before restarting stimulant. She will go forward with starting buspar and follow-up in 1-2 month. Could consider readdinga dderall at that time.             Subjective   Karishma is a 23 year old, presenting for the following health issues:  No chief complaint on file.    HPI     History of anxiety, last visit a month ago, we added buspar to lexapro but she was too anxious to do this. She notes she has tried the atenolol and zofran for acute anxiety which has been helpful. She does feel the day to day anxiety is less overwhelming. She notes difficulty with staying focused and wondering about restarting adderall. She was last on it in 2020 rxed by obgyn. 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         12/15/2022     2:02 PM 12/20/2022    11:22 AM 3/24/2023    10:05 AM   VALENTE-7 SCORE   Total Score 15 (severe anxiety)     Total Score 15 16 3         12/15/2022     2:02 PM 12/20/2022    11:08 AM 3/24/2023    10:05 AM   PHQ   PHQ-9 Total Score 13 17 2   Q9: Thoughts of better off dead/self-harm past 2 weeks Not at all Not at all Not at all             Objective           Vitals:  No vitals were obtained today due to virtual visit.    Physical Exam   General: Alert and no distress //Respiratory: No audible wheeze, cough, or shortness of breath // Psychiatric:  Appropriate affect, tone, and pace of words          Phone call duration: 13 minutes  Signed Electronically by: TAMMY Mcgraw CNP

## 2024-04-28 DIAGNOSIS — F41.1 GENERALIZED ANXIETY DISORDER: ICD-10-CM

## 2024-04-29 RX ORDER — ATENOLOL 25 MG/1
25 TABLET ORAL DAILY PRN
Qty: 30 TABLET | Refills: 0 | OUTPATIENT
Start: 2024-04-29

## 2024-04-29 NOTE — PROGRESS NOTES
23w1d  IVF, Di/Di Twins    Pt on Ecosprin 150mg for pregnancy. Only has 10 tablets left and not sure where to get more as this Rx came with her from [facility].  States Dr Rivera asked her to continue it through pregnancy.    Comparable to Baby Aspirin OTC?  Routing pt mychart message to provider to advise.  Kamla Parmar RN on 4/29/2024 at 10:16 AM     SUBJECTIVE:   Karishma Tyson is a 21 year old female presenting with a chief complaint of   Chief Complaint   Patient presents with     Urgent Care     Derm Problem     rash mostly on bottom, legs and arms. Going on x 1 month, only slight itching       She is an established patient of [facility].  Patient presents with complaints of itchy rash, mild x 1 month.  Fever a few days ago tmax 102.  Left buttock first.  Now on right arm on left legs.      Treatment:  Nothing.          Review of Systems   Constitutional: Positive for fever.   Skin: Positive for rash.   All other systems reviewed and are negative.      Past Medical History:   Diagnosis Date     Abdominal pain 2010     Constipation 2012     DERMATITIS NOS 10/13/2006      jaundice     admitted for phototherapy     OTITIS MEDIA NOS 10/13/2006     Family History   Problem Relation Age of Onset     Depression Mother      Gastrointestinal Disease Mother         IBS     Neurologic Disorder Father         seizures secondary to TBI     Gastrointestinal Disease Maternal Grandmother         gallstones     Gastrointestinal Disease Maternal Grandfather         similar abdominal pain and constipation as Karishma, no known cause     Heart Disease Maternal Grandfather         mitral valve prolapse     Arthritis Maternal Grandfather         rheumatoid arthritis     Psychotic Disorder Paternal Grandmother      Gastrointestinal Disease Paternal Grandmother         H. Pylori     Gastrointestinal Disease Paternal Grandfather         colitis     Heart Surgery Paternal Grandfather 76     Current Outpatient Medications   Medication Sig Dispense Refill     amphetamine-dextroamphetamine (ADDERALL XR) 10 MG 24 hr capsule        clotrimazole-betamethasone (LOTRISONE) 1-0.05 % external cream Apply topically 2 times daily 45 g 0     escitalopram (LEXAPRO) 20 MG tablet Take 1 tablet (20 mg) by mouth daily 30 tablet 0     etonogestrel-ethinyl estradiol (NUVARING)  "0.12-0.015 MG/24HR vaginal ring Place 1 each vaginally every 28 days 3 each 3     multivitamin, therapeutic with minerals (MULTI-VITAMIN) TABS Take 1 tablet by mouth daily. (Patient not taking: Reported on 12/6/2021)       mupirocin (BACTROBAN) 2 % external ointment Apply topically 3 times daily 30 g 0     Social History     Tobacco Use     Smoking status: Never Smoker     Smokeless tobacco: Never Used     Tobacco comment: non smoking home   Substance Use Topics     Alcohol use: No       OBJECTIVE  BP 98/58   Pulse 76   Temp 98  F (36.7  C) (Temporal)   Resp 16   Ht 1.651 m (5' 5\")   Wt 49.9 kg (110 lb)   LMP 03/28/2022   SpO2 98%   Breastfeeding No   BMI 18.30 kg/m      Physical Exam  Vitals and nursing note reviewed.   Constitutional:       Appearance: Normal appearance. She is normal weight.   Eyes:      Extraocular Movements: Extraocular movements intact.      Conjunctiva/sclera: Conjunctivae normal.   Cardiovascular:      Rate and Rhythm: Normal rate.   Skin:     Comments: Right forearm, upper arm, buttock with varying sizes of circular raised, fine scaled lesions   Neurological:      Mental Status: She is alert.   Psychiatric:         Mood and Affect: Mood normal.         Labs:  No results found for this or any previous visit (from the past 24 hour(s)).    X-Ray was not done.    ASSESSMENT:      ICD-10-CM    1. Ringworm  B35.9 clotrimazole-betamethasone (LOTRISONE) 1-0.05 % external cream        Medical Decision Making:    Differential Diagnosis:  Rigworm, eczema    Serious Comorbid Conditions:  Adult:  None    PLAN:    Rx for lotrisone.  Discussed expected course.  Patient education.      Followup:    If not improving or if condition worsens, follow up with your Primary Care Provider, If not improving or if conditions worsens over the next 12-24 hours, go to the Emergency Department    Patient Instructions     Patient Education     Ringworm of the Skin     Ringworm is a fungal infection of the skin. " Despite the name, a worm doesn't cause it. The cause of ringworm is a fungus that infects the outer layers of the skin.  The medical term for ringworm is tinea. It can affect most parts of your body, although it seems to do better in moist areas of the body and around hair. It can be on almost any part of your body, including:    Arms, hands, legs, chest, feet, and back    Scalp    Beard    Groin    Between the toes  Depending on where it is located, sometimes the name changes. For example:    Tinea capitis (scalp)    Tinea cruris (groin)    Tinea corporis (body)    Tinea pedis (feet)  Causes  Ringworm is very common all over the world, including the [place] It can take less than 1 week up to 2 weeks before you develop the infection after being exposed. So, you may not figure out the exact cause.  It's spread through direct contact with:    An infected person or animal    Infected soil, or objects such as towels, clothing, and blanco  Symptoms  At first you might not notice ringworm. Or you may just see a small, red, often raised itchy spot or pimple. Sometimes there may only be one spot. At other times there may be several. Ringworm can look slightly different on different parts of the body, but there are some things are always present:    Irregular, round, oval or ring-shaped, which is why it's called ringworm    Clearer or lighter color at the center, since it spreads from the center of the spot outward    Red or inflamed look    Raised    Itchy    Scaly, dry, or flaky  Home care  Follow these tips to help care for yourself at home:    Leave it alone. Don't scratch at the rash or pick it. This can increase the chance of infection and scarring.    Take medicine as prescribed. If you were prescribed a cream, apply it exactly as directed. Make sure to put the cream not just on the rash, but also on the skin 1 or 2 inches around it. Medicine by mouth is sometimes needed, particularly for ringworm on the scalp. Take it as  directed and until your healthcare provider says to stop. Some ringworm creams are now available without a prescription (over the counter). Talk with your healthcare provider about these, as they may be just as effective but less expensive than prescription medicines in some cases.    Keep it from spreading to others.  Untreated ringworm of the skin is contagious by skin-to-skin contact. An affected child may return to school 2 days after treatment has started.  Prevention  To some degree, prevention depends on what part of your body was affected. In general, the following good hygiene can help.    Clean up after you get dirty or sweaty, or after using a locker room.    When possible, don t share blanco and brushes.    Avoid having your skin and feet wet or damp for long periods.    Wear clean, loose-fitting underwear.  Follow-up care  Follow up with your healthcare provider as advised by our staff if the rash does not improve after 10 days of treatment or if the rash spreads to other areas of the body.  When to seek medical care  Call your healthcare provider right away if any of these occur:    Redness around the rash gets worse    Fluid drains from the rash    Fever of 100.4 F (38 C) or higher, or as directed by your healthcare provider  [employer] last reviewed this educational content on 8/1/2019 2000-2021 [employer]. All rights reserved. This information is not intended as a substitute for professional medical care. Always follow your healthcare professional's instructions.

## 2024-06-22 ENCOUNTER — HEALTH MAINTENANCE LETTER (OUTPATIENT)
Age: 24
End: 2024-06-22

## 2024-09-30 DIAGNOSIS — F41.1 GENERALIZED ANXIETY DISORDER: ICD-10-CM

## 2024-09-30 DIAGNOSIS — F33.1 MODERATE EPISODE OF RECURRENT MAJOR DEPRESSIVE DISORDER (H): ICD-10-CM

## 2024-10-01 RX ORDER — ESCITALOPRAM OXALATE 20 MG/1
20 TABLET ORAL DAILY
Qty: 90 TABLET | Refills: 1 | Status: SHIPPED | OUTPATIENT
Start: 2024-10-01

## 2024-12-03 ENCOUNTER — OFFICE VISIT (OUTPATIENT)
Dept: URGENT CARE | Facility: URGENT CARE | Age: 24
End: 2024-12-03
Payer: COMMERCIAL

## 2024-12-03 VITALS
HEART RATE: 84 BPM | BODY MASS INDEX: 19.14 KG/M2 | DIASTOLIC BLOOD PRESSURE: 55 MMHG | TEMPERATURE: 98.9 F | OXYGEN SATURATION: 98 % | SYSTOLIC BLOOD PRESSURE: 97 MMHG | RESPIRATION RATE: 18 BRPM | WEIGHT: 115 LBS

## 2024-12-03 DIAGNOSIS — R52 GENERALIZED BODY ACHES: ICD-10-CM

## 2024-12-03 DIAGNOSIS — R07.0 THROAT PAIN: Primary | ICD-10-CM

## 2024-12-03 DIAGNOSIS — R51.9 GENERALIZED HEADACHE: ICD-10-CM

## 2024-12-03 DIAGNOSIS — R50.9 FEVER IN ADULT: ICD-10-CM

## 2024-12-03 LAB
DEPRECATED S PYO AG THROAT QL EIA: NEGATIVE
FLUAV AG SPEC QL IA: NEGATIVE
FLUBV AG SPEC QL IA: NEGATIVE

## 2024-12-03 PROCEDURE — 87635 SARS-COV-2 COVID-19 AMP PRB: CPT | Performed by: PHYSICIAN ASSISTANT

## 2024-12-03 PROCEDURE — 87804 INFLUENZA ASSAY W/OPTIC: CPT | Performed by: PHYSICIAN ASSISTANT

## 2024-12-03 PROCEDURE — 99214 OFFICE O/P EST MOD 30 MIN: CPT | Performed by: PHYSICIAN ASSISTANT

## 2024-12-03 PROCEDURE — 87651 STREP A DNA AMP PROBE: CPT | Performed by: PHYSICIAN ASSISTANT

## 2024-12-03 NOTE — LETTER
December 3, 2024      Karishma Tyson  [address] [place] [address]        To Whom It May Concern:    Karishma Tyson was seen in our clinic today for evaluation of an acute illness. Please excuse her from missed school this week due to illness. She may return to school when she is fever free for 24 hours.       Sincerely,        Jared Burnett PA-C

## 2024-12-04 LAB
GROUP A STREP BY PCR: NOT DETECTED
SARS-COV-2 RNA RESP QL NAA+PROBE: NEGATIVE

## 2024-12-04 NOTE — PROGRESS NOTES
Onset this morning     Generalized body aches      Generalized headache     Subjective  Feverish       Chief Complaint   Patient presents with    Urgent Care     Fatigue today, headache, sore throat            SUBJECTIVE:     Karishma Tyson 24 year old female who presents to  today for evaluation of possible Strep throat.      HPI: *** Patient reports acute onset, sore throat, generalized body aches, generalized waxing and waning headaches and fatigue 2 days ago.  Patient confirms she is still able to take in good fluids and soft food despite sore throat.      Illness Contact: ***         ROS:   CONSITUTIONAL: No fever or chills. Positive malaise. No severe fatigue ***(still able to do all self  cares)  HEENT: Positive sore throat as per above HPI. No difficulty talking, swallowing, opening mouth or breathing upon questioning today.   No nasal congestion. No other ENT sxs.   RESP: No acute onset cough, wheezing or shortness of breath   GI: No acute onset nausea, vomiting or diarrhea. . No abdominal pain.   SKIN: No acute rash or hives    NEURO: Positive mild, waxing and waning generalized headache as per above. Denies any severe headaches, neck stiffness, photophobia, rash, mental status changes or lethargy.   URINARY: Reports good PO (by mouth) fluid intake and normal UOP (urine output).          Past Medical History:   Diagnosis Date    Abdominal pain 2010    Anorexia nervosa (H)     Constipation 2012    DERMATITIS NOS 10/13/2006     jaundice     admitted for phototherapy    OTITIS MEDIA NOS 10/13/2006       Patient Active Problem List   Diagnosis    Contact dermatitis and other eczema, due to unspecified cause    Generalized anxiety disorder    Current severe episode of major depressive disorder without psychotic features without prior episode (H)    Attention deficit hyperactivity disorder (ADHD), combined type    Dysmenorrhea         Current Outpatient Medications   Medication  Sig Dispense Refill    albuterol (PROAIR HFA) 108 (90 Base) MCG/ACT inhaler Inhale 2 puffs into the lungs every 6 hours 18 g 0    atenolol (TENORMIN) 25 MG tablet Take 1 tablet (25 mg) by mouth daily as needed (anxiety) 30 tablet 0    busPIRone (BUSPAR) 10 MG tablet Take 1 tablet (10 mg) by mouth 2 times daily 180 tablet 0    escitalopram (LEXAPRO) 20 MG tablet Take 1 tablet (20 mg) by mouth daily. 90 tablet 1    etonogestrel-ethinyl estradiol (NUVARING) 0.12-0.015 MG/24HR vaginal ring Insert one (1) ring vaginally and leave in place for 3 consecutive weeks (21 days), then remove for 1 week. 4 each 11    multivitamin w/minerals (THERA-VIT-M) tablet Take 1 tablet by mouth daily      ondansetron (ZOFRAN ODT) 4 MG ODT tab Take 1 tablet (4 mg) by mouth every 8 hours as needed for nausea 30 tablet 1     No current facility-administered medications for this visit.     Allergies   Allergen Reactions    Gluten GI Disturbance     significance           OBJECTIVE:  BP 97/55   Pulse 84   Temp 98.9  F (37.2  C)   Resp 18   Wt 52.2 kg (115 lb)   SpO2 98%   BMI 19.14 kg/m              General appearance: alert and no apparent distress  Skin color is pink and without rash.  HEENT:   Conjunctiva not injected.  Sclera clear.  Left TM is normal: no effusions, no erythema, and normal landmarks.  Right TM is normal: no effusions, no erythema, and normal landmarks.  Nasal mucosa is normal.  Oropharyngeal exam is positive for mild erythema.  No asymmetry. Uvula is midline. No trismus. Voice is clear. No lesions, adenopathy, plaque or exudate.  Neck is supple, FROM. No neck stiffness. No adenopathy  CARDIAC:NORMAL - regular rate and rhythm without murmur.  RESP: No increased work of breathing. No retractions. No stridor. Lung fields are clear to ausculation. No rales, rhonchi, or wheezing.  NEURO: ***Alert and oriented.  Normal speech and mentation.  CN II/XII grossly intact.  Gait within normal limits.          LAB:      Results  for orders placed or performed in visit on 12/03/24   Streptococcus A Rapid Screen w/Reflex to PCR - Clinic Collect     Status: Normal    Specimen: Throat; Swab   Result Value Ref Range    Group A Strep antigen Negative Negative   Influenza A & B Antigen - Clinic Collect     Status: Normal    Specimen: Nose; Swab   Result Value Ref Range    Influenza A antigen Negative Negative    Influenza B antigen Negative Negative    Narrative    Test results must be correlated with clinical data. If necessary, results should be confirmed by a molecular assay or viral culture.          ASSESSMENT/PLAN:            Infectious  Diseases [facility]. This laboratory is certified under  the Clinical Laboratory Improvement Amendments of 1988 (CLIA-88) as  qualified to perform high complexity laboratory testing.   Streptococcus A Rapid Screen w/Reflex to PCR - Clinic Collect     Status: Normal    Specimen: Throat; Swab   Result Value Ref Range    Group A Strep antigen Negative Negative   Group A Streptococcus PCR Throat Swab     Status: Normal    Specimen: Throat; Swab   Result Value Ref Range    Group A strep by PCR Not Detected Not Detected    Narrative    The Xpert Xpress Strep A test, performed on the [employer], is a rapid, qualitative in vitro diagnostic test for the detection of Streptococcus pyogenes (Group A ß-hemolytic Streptococcus, Strep A) in throat swab specimens from patients with signs and symptoms of pharyngitis. The Xpert Xpress Strep A test can be used as an aid in the diagnosis of Group A Streptococcal pharyngitis. The assay is not intended to monitor treatment for Group A Streptococcus infections. The Xpert Xpress Strep A test utilizes an automated real-time polymerase chain reaction (PCR) to detect Streptococcus pyogenes DNA.   Influenza A & B Antigen - Clinic Collect     Status: Normal    Specimen: Nose; Swab   Result Value Ref Range    Influenza A antigen Negative Negative    Influenza B antigen Negative Negative    Narrative    Test results must be correlated with clinical data. If necessary, results should be confirmed by a molecular assay or viral culture.

## 2025-01-31 ENCOUNTER — ORDERS ONLY (AUTO-RELEASED) (OUTPATIENT)
Dept: FAMILY MEDICINE | Facility: CLINIC | Age: 25
End: 2025-01-31

## 2025-01-31 DIAGNOSIS — R00.2 PALPITATIONS: ICD-10-CM

## 2025-03-27 ENCOUNTER — E-VISIT (OUTPATIENT)
Dept: PEDIATRICS | Facility: CLINIC | Age: 25
End: 2025-03-27
Payer: COMMERCIAL

## 2025-03-27 DIAGNOSIS — F33.1 MODERATE EPISODE OF RECURRENT MAJOR DEPRESSIVE DISORDER (H): ICD-10-CM

## 2025-03-27 DIAGNOSIS — F41.1 GENERALIZED ANXIETY DISORDER: ICD-10-CM

## 2025-03-27 RX ORDER — ESCITALOPRAM OXALATE 20 MG/1
20 TABLET ORAL DAILY
Qty: 90 TABLET | Refills: 1 | OUTPATIENT
Start: 2025-03-27

## 2025-03-27 ASSESSMENT — ANXIETY QUESTIONNAIRES
7. FEELING AFRAID AS IF SOMETHING AWFUL MIGHT HAPPEN: SEVERAL DAYS
6. BECOMING EASILY ANNOYED OR IRRITABLE: SEVERAL DAYS
2. NOT BEING ABLE TO STOP OR CONTROL WORRYING: SEVERAL DAYS
4. TROUBLE RELAXING: MORE THAN HALF THE DAYS
IF YOU CHECKED OFF ANY PROBLEMS ON THIS QUESTIONNAIRE, HOW DIFFICULT HAVE THESE PROBLEMS MADE IT FOR YOU TO DO YOUR WORK, TAKE CARE OF THINGS AT HOME, OR GET ALONG WITH OTHER PEOPLE: SOMEWHAT DIFFICULT
GAD7 TOTAL SCORE: 8
1. FEELING NERVOUS, ANXIOUS, OR ON EDGE: SEVERAL DAYS
5. BEING SO RESTLESS THAT IT IS HARD TO SIT STILL: SEVERAL DAYS
8. IF YOU CHECKED OFF ANY PROBLEMS, HOW DIFFICULT HAVE THESE MADE IT FOR YOU TO DO YOUR WORK, TAKE CARE OF THINGS AT HOME, OR GET ALONG WITH OTHER PEOPLE?: SOMEWHAT DIFFICULT
7. FEELING AFRAID AS IF SOMETHING AWFUL MIGHT HAPPEN: SEVERAL DAYS
3. WORRYING TOO MUCH ABOUT DIFFERENT THINGS: SEVERAL DAYS
GAD7 TOTAL SCORE: 8

## 2025-03-27 NOTE — TELEPHONE ENCOUNTER
Provider E-Visit time total (minutes): 7 minutes        12/20/2022    11:08 AM 3/24/2023    10:05 AM 1/31/2025     2:19 PM   PHQ   PHQ-9 Total Score 17 2 10    Q9: Thoughts of better off dead/self-harm past 2 weeks Not at all  Not at all Not at all       Patient-reported    Proxy-reported         12/20/2022    11:22 AM 3/24/2023    10:05 AM 3/27/2025     1:12 PM   VALENTE-7 SCORE   Total Score   8 (mild anxiety)   Total Score 16 3 8        Patient-reported

## 2025-04-03 LAB — CV ZIO PRELIM RESULTS: NORMAL

## 2025-07-12 ENCOUNTER — HEALTH MAINTENANCE LETTER (OUTPATIENT)
Age: 25
End: 2025-07-12

## 2025-07-28 DIAGNOSIS — R11.0 NAUSEA: ICD-10-CM

## 2025-07-28 RX ORDER — ONDANSETRON 4 MG/1
4 TABLET, ORALLY DISINTEGRATING ORAL EVERY 8 HOURS PRN
Qty: 30 TABLET | Refills: 0 | Status: SHIPPED | OUTPATIENT
Start: 2025-07-28